# Patient Record
Sex: FEMALE | Race: ASIAN | NOT HISPANIC OR LATINO
[De-identification: names, ages, dates, MRNs, and addresses within clinical notes are randomized per-mention and may not be internally consistent; named-entity substitution may affect disease eponyms.]

---

## 2017-04-10 PROBLEM — Z00.00 ENCOUNTER FOR PREVENTIVE HEALTH EXAMINATION: Status: ACTIVE | Noted: 2017-04-10

## 2017-04-12 PROBLEM — Z86.79 HISTORY OF HYPERTENSION: Status: RESOLVED | Noted: 2017-04-12 | Resolved: 2017-04-12

## 2017-04-12 PROBLEM — Z86.39 HISTORY OF TYPE 2 DIABETES MELLITUS: Status: RESOLVED | Noted: 2017-04-12 | Resolved: 2017-04-12

## 2017-04-12 PROBLEM — Z86.39 HISTORY OF HYPERLIPIDEMIA: Status: RESOLVED | Noted: 2017-04-12 | Resolved: 2017-04-12

## 2017-04-12 PROBLEM — I25.10 CAD (CORONARY ARTERY DISEASE): Status: RESOLVED | Noted: 2017-04-12 | Resolved: 2017-04-12

## 2017-04-12 RX ORDER — AMLODIPINE BESYLATE 10 MG/1
10 TABLET ORAL
Refills: 0 | Status: ACTIVE | COMMUNITY

## 2017-04-12 RX ORDER — PRASUGREL HYDROCHLORIDE 10 MG/1
10 TABLET, COATED ORAL
Refills: 0 | Status: ACTIVE | COMMUNITY

## 2017-04-12 RX ORDER — ATORVASTATIN CALCIUM 20 MG/1
20 TABLET, FILM COATED ORAL
Refills: 0 | Status: ACTIVE | COMMUNITY

## 2017-04-12 RX ORDER — GEMFIBROZIL 600 MG/1
600 TABLET, FILM COATED ORAL
Refills: 0 | Status: ACTIVE | COMMUNITY

## 2017-04-12 RX ORDER — OMEPRAZOLE 20 MG/1
20 CAPSULE, DELAYED RELEASE ORAL
Refills: 0 | Status: ACTIVE | COMMUNITY

## 2017-04-12 RX ORDER — METFORMIN HYDROCHLORIDE 1000 MG/1
1000 TABLET, COATED ORAL
Refills: 0 | Status: ACTIVE | COMMUNITY

## 2017-04-12 RX ORDER — METOPROLOL SUCCINATE 50 MG/1
50 TABLET, EXTENDED RELEASE ORAL
Refills: 0 | Status: ACTIVE | COMMUNITY

## 2017-04-12 RX ORDER — ASPIRIN 81 MG/1
81 TABLET ORAL
Refills: 0 | Status: ACTIVE | COMMUNITY

## 2017-04-21 ENCOUNTER — APPOINTMENT (OUTPATIENT)
Dept: THORACIC SURGERY | Facility: CLINIC | Age: 67
End: 2017-04-21

## 2017-04-21 ENCOUNTER — OUTPATIENT (OUTPATIENT)
Dept: OUTPATIENT SERVICES | Facility: HOSPITAL | Age: 67
LOS: 1 days | End: 2017-04-21
Payer: MEDICAID

## 2017-04-21 VITALS
OXYGEN SATURATION: 97 % | WEIGHT: 121 LBS | TEMPERATURE: 97.8 F | DIASTOLIC BLOOD PRESSURE: 72 MMHG | RESPIRATION RATE: 20 BRPM | SYSTOLIC BLOOD PRESSURE: 150 MMHG | BODY MASS INDEX: 27.22 KG/M2 | HEIGHT: 56 IN | HEART RATE: 100 BPM

## 2017-04-21 DIAGNOSIS — Z86.39 PERSONAL HISTORY OF OTHER ENDOCRINE, NUTRITIONAL AND METABOLIC DISEASE: ICD-10-CM

## 2017-04-21 DIAGNOSIS — Z86.79 PERSONAL HISTORY OF OTHER DISEASES OF THE CIRCULATORY SYSTEM: ICD-10-CM

## 2017-04-21 DIAGNOSIS — I25.10 ATHEROSCLEROTIC HEART DISEASE OF NATIVE CORONARY ARTERY W/OUT ANGINA PECTORIS: ICD-10-CM

## 2017-04-24 DIAGNOSIS — R91.1 SOLITARY PULMONARY NODULE: ICD-10-CM

## 2017-04-27 NOTE — PATIENT PROFILE ADULT. - PMH
CAD (coronary artery disease)    Diabetes    Hyperlipidemia    Hypertension    Lung cancer    Myocardial infarction

## 2017-04-28 ENCOUNTER — RESULT REVIEW (OUTPATIENT)
Age: 67
End: 2017-04-28

## 2017-04-28 ENCOUNTER — INPATIENT (INPATIENT)
Facility: HOSPITAL | Age: 67
LOS: 2 days | Discharge: HOME | DRG: 165 | End: 2017-05-01
Attending: THORACIC SURGERY (CARDIOTHORACIC VASCULAR SURGERY) | Admitting: THORACIC SURGERY (CARDIOTHORACIC VASCULAR SURGERY)
Payer: MEDICAID

## 2017-04-28 ENCOUNTER — APPOINTMENT (OUTPATIENT)
Dept: THORACIC SURGERY | Facility: HOSPITAL | Age: 67
End: 2017-04-28

## 2017-04-28 VITALS
SYSTOLIC BLOOD PRESSURE: 143 MMHG | HEART RATE: 98 BPM | WEIGHT: 117.29 LBS | OXYGEN SATURATION: 99 % | RESPIRATION RATE: 16 BRPM | HEIGHT: 62 IN | DIASTOLIC BLOOD PRESSURE: 65 MMHG | TEMPERATURE: 99 F

## 2017-04-28 DIAGNOSIS — Z41.9 ENCOUNTER FOR PROCEDURE FOR PURPOSES OTHER THAN REMEDYING HEALTH STATE, UNSPECIFIED: Chronic | ICD-10-CM

## 2017-04-28 DIAGNOSIS — Z98.890 OTHER SPECIFIED POSTPROCEDURAL STATES: Chronic | ICD-10-CM

## 2017-04-28 DIAGNOSIS — R91.8 OTHER NONSPECIFIC ABNORMAL FINDING OF LUNG FIELD: ICD-10-CM

## 2017-04-28 LAB
ANION GAP SERPL CALC-SCNC: 9 MMOL/L — SIGNIFICANT CHANGE UP (ref 9–16)
APTT BLD: 36.7 SEC — SIGNIFICANT CHANGE UP (ref 27.5–37.4)
BASOPHILS NFR BLD AUTO: 0.2 % — SIGNIFICANT CHANGE UP (ref 0–2)
BUN SERPL-MCNC: 9 MG/DL — SIGNIFICANT CHANGE UP (ref 7–23)
CALCIUM SERPL-MCNC: 8.1 MG/DL — LOW (ref 8.5–10.5)
CHLORIDE SERPL-SCNC: 105 MMOL/L — SIGNIFICANT CHANGE UP (ref 96–108)
CO2 SERPL-SCNC: 27 MMOL/L — SIGNIFICANT CHANGE UP (ref 22–31)
CREAT SERPL-MCNC: 0.35 MG/DL — LOW (ref 0.5–1.3)
EOSINOPHIL NFR BLD AUTO: 0.1 % — SIGNIFICANT CHANGE UP (ref 0–6)
GLUCOSE SERPL-MCNC: 183 MG/DL — HIGH (ref 70–99)
HCT VFR BLD CALC: 30.9 % — LOW (ref 34.5–45)
HGB BLD-MCNC: 10.3 G/DL — LOW (ref 11.5–15.5)
INR BLD: 1.09 — SIGNIFICANT CHANGE UP (ref 0.88–1.16)
LYMPHOCYTES # BLD AUTO: 9.1 % — LOW (ref 13–44)
MAGNESIUM SERPL-MCNC: 1.4 MG/DL — LOW (ref 1.6–2.4)
MCHC RBC-ENTMCNC: 27.2 PG — SIGNIFICANT CHANGE UP (ref 27–34)
MCHC RBC-ENTMCNC: 33.3 G/DL — SIGNIFICANT CHANGE UP (ref 32–36)
MCV RBC AUTO: 81.7 FL — SIGNIFICANT CHANGE UP (ref 80–100)
MONOCYTES NFR BLD AUTO: 4.6 % — SIGNIFICANT CHANGE UP (ref 2–14)
NEUTROPHILS NFR BLD AUTO: 86 % — HIGH (ref 43–77)
PLATELET # BLD AUTO: 391 K/UL — SIGNIFICANT CHANGE UP (ref 150–400)
POTASSIUM SERPL-MCNC: 3.2 MMOL/L — LOW (ref 3.5–5.3)
POTASSIUM SERPL-SCNC: 3.2 MMOL/L — LOW (ref 3.5–5.3)
PROTHROM AB SERPL-ACNC: 12.1 SEC — SIGNIFICANT CHANGE UP (ref 9.8–12.7)
RBC # BLD: 3.78 M/UL — LOW (ref 3.8–5.2)
RBC # FLD: 14.7 % — SIGNIFICANT CHANGE UP (ref 10.3–16.9)
SODIUM SERPL-SCNC: 141 MMOL/L — SIGNIFICANT CHANGE UP (ref 135–145)
WBC # BLD: 22.8 K/UL — HIGH (ref 3.8–10.5)
WBC # FLD AUTO: 22.8 K/UL — HIGH (ref 3.8–10.5)

## 2017-04-28 PROCEDURE — 32663 THORACOSCOPY W/LOBECTOMY: CPT

## 2017-04-28 PROCEDURE — 32668 THORACOSCOPY W/W RESECT DIAG: CPT

## 2017-04-28 PROCEDURE — 32674 THORACOSCOPY LYMPH NODE EXC: CPT

## 2017-04-28 PROCEDURE — 32663 THORACOSCOPY W/LOBECTOMY: CPT | Mod: AS

## 2017-04-28 PROCEDURE — 32668 THORACOSCOPY W/W RESECT DIAG: CPT | Mod: AS

## 2017-04-28 PROCEDURE — 71010: CPT | Mod: 26

## 2017-04-28 PROCEDURE — 32674 THORACOSCOPY LYMPH NODE EXC: CPT | Mod: AS

## 2017-04-28 PROCEDURE — S2900 ROBOTIC SURGICAL SYSTEM: CPT | Mod: NC

## 2017-04-28 RX ORDER — DEXTROSE 50 % IN WATER 50 %
25 SYRINGE (ML) INTRAVENOUS ONCE
Qty: 0 | Refills: 0 | Status: DISCONTINUED | OUTPATIENT
Start: 2017-04-28 | End: 2017-05-01

## 2017-04-28 RX ORDER — GLUCAGON INJECTION, SOLUTION 0.5 MG/.1ML
1 INJECTION, SOLUTION SUBCUTANEOUS ONCE
Qty: 0 | Refills: 0 | Status: DISCONTINUED | OUTPATIENT
Start: 2017-04-28 | End: 2017-05-01

## 2017-04-28 RX ORDER — ATORVASTATIN CALCIUM 80 MG/1
20 TABLET, FILM COATED ORAL AT BEDTIME
Qty: 0 | Refills: 0 | Status: DISCONTINUED | OUTPATIENT
Start: 2017-04-28 | End: 2017-05-01

## 2017-04-28 RX ORDER — SODIUM CHLORIDE 9 MG/ML
1000 INJECTION, SOLUTION INTRAVENOUS
Qty: 0 | Refills: 0 | Status: DISCONTINUED | OUTPATIENT
Start: 2017-04-28 | End: 2017-04-29

## 2017-04-28 RX ORDER — AMLODIPINE BESYLATE 2.5 MG/1
5 TABLET ORAL DAILY
Qty: 0 | Refills: 0 | Status: DISCONTINUED | OUTPATIENT
Start: 2017-04-28 | End: 2017-04-29

## 2017-04-28 RX ORDER — CEFAZOLIN SODIUM 1 G
2000 VIAL (EA) INJECTION EVERY 8 HOURS
Qty: 0 | Refills: 0 | Status: COMPLETED | OUTPATIENT
Start: 2017-04-29 | End: 2017-04-29

## 2017-04-28 RX ORDER — BUPIVACAINE 13.3 MG/ML
20 INJECTION, SUSPENSION, LIPOSOMAL INFILTRATION ONCE
Qty: 0 | Refills: 0 | Status: DISCONTINUED | OUTPATIENT
Start: 2017-04-28 | End: 2017-04-29

## 2017-04-28 RX ORDER — ASPIRIN/CALCIUM CARB/MAGNESIUM 324 MG
81 TABLET ORAL DAILY
Qty: 0 | Refills: 0 | Status: DISCONTINUED | OUTPATIENT
Start: 2017-04-29 | End: 2017-05-01

## 2017-04-28 RX ORDER — ACETAMINOPHEN 500 MG
1000 TABLET ORAL ONCE
Qty: 0 | Refills: 0 | Status: COMPLETED | OUTPATIENT
Start: 2017-04-29 | End: 2017-04-29

## 2017-04-28 RX ORDER — PANTOPRAZOLE SODIUM 20 MG/1
40 TABLET, DELAYED RELEASE ORAL
Qty: 0 | Refills: 0 | Status: DISCONTINUED | OUTPATIENT
Start: 2017-04-28 | End: 2017-05-01

## 2017-04-28 RX ORDER — INSULIN LISPRO 100/ML
VIAL (ML) SUBCUTANEOUS
Qty: 0 | Refills: 0 | Status: DISCONTINUED | OUTPATIENT
Start: 2017-04-28 | End: 2017-05-01

## 2017-04-28 RX ORDER — DEXTROSE 50 % IN WATER 50 %
1 SYRINGE (ML) INTRAVENOUS ONCE
Qty: 0 | Refills: 0 | Status: DISCONTINUED | OUTPATIENT
Start: 2017-04-28 | End: 2017-05-01

## 2017-04-28 RX ORDER — SODIUM CHLORIDE 9 MG/ML
1000 INJECTION, SOLUTION INTRAVENOUS
Qty: 0 | Refills: 0 | Status: DISCONTINUED | OUTPATIENT
Start: 2017-04-28 | End: 2017-05-01

## 2017-04-28 RX ORDER — METOPROLOL TARTRATE 50 MG
12.5 TABLET ORAL EVERY 6 HOURS
Qty: 0 | Refills: 0 | Status: DISCONTINUED | OUTPATIENT
Start: 2017-04-29 | End: 2017-04-30

## 2017-04-28 RX ORDER — MORPHINE SULFATE 50 MG/1
2 CAPSULE, EXTENDED RELEASE ORAL EVERY 4 HOURS
Qty: 0 | Refills: 0 | Status: DISCONTINUED | OUTPATIENT
Start: 2017-04-28 | End: 2017-04-29

## 2017-04-28 RX ORDER — DEXTROSE 50 % IN WATER 50 %
12.5 SYRINGE (ML) INTRAVENOUS ONCE
Qty: 0 | Refills: 0 | Status: DISCONTINUED | OUTPATIENT
Start: 2017-04-28 | End: 2017-05-01

## 2017-04-28 RX ADMIN — AMLODIPINE BESYLATE 5 MILLIGRAM(S): 2.5 TABLET ORAL at 23:13

## 2017-04-28 RX ADMIN — PANTOPRAZOLE SODIUM 40 MILLIGRAM(S): 20 TABLET, DELAYED RELEASE ORAL at 23:11

## 2017-04-28 RX ADMIN — ATORVASTATIN CALCIUM 20 MILLIGRAM(S): 80 TABLET, FILM COATED ORAL at 23:12

## 2017-04-28 NOTE — H&P ADULT - HISTORY OF PRESENT ILLNESS
65 y/o female with PMHx HTN, diabetes, CAD s/p stents x 2, HLD, who presented to her PCP with right hip herpes in 2/2017.  She was incidentally found to have a LLL cavity mass; Quantiferon TB Gold test was negative, sputum cx negative in 2017.  CT chest showed a solid mass with several areas of cavitation in the LLL. PET showed a cavitary lesion in the LLL of the lung as well as some small pulmonary nodules.  Denies smoking history, denies fever/chills, SOB, cough or hemoptysis.      Patient seen in [facility], denies any changes to medications since last seen in Dr. Monroe's office.     **Last took effient 1 week ago**

## 2017-04-28 NOTE — BRIEF OPERATIVE NOTE - PROCEDURE
Lobectomy of left lung  04/28/2017  LVATS, LLL wedge resection followed by completion left lower lobectomy with mediastinal lymph node dissection.  Active  PMAINGON

## 2017-04-28 NOTE — H&P ADULT - ASSESSMENT
67 y/o female with PMHx HTN, diabetes, CAD s/p stents x 2, HLD, who presented to her PCP with right hip herpes in 2/2017.  She was incidentally found to have a LLL cavity mass; Evaluated by Dr. Monroe in the out patient setting, and all appropriate CT scans/PET were done as part of pre-op work up.  She is here today for elective surgery.

## 2017-04-28 NOTE — H&P ADULT - NSHPREVIEWOFSYSTEMS_GEN_ALL_CORE
Review of Systems  CONSTITUTIONAL:  Denies Fevers / chills, sweats, fatigue, weight loss, weight gain                                      NEURO:  Denies parathesias, seizures, syncope, confusion                                                                                EYES:  Denies Blurry vision, discharge, pain, loss of vision                                                                                    ENMT:  Denies Difficulty hearing, vertigo, dysphagia, epistaxis, recent dental work                                       CV:  Denies Chest pain, palpitations, ALBERT, orthopnea                                                                                          RESPIRATORY:  Denies Wheezing, SOB, cough / sputum, hemoptysis                                                                GI:  Denies Nausea, vommiting, diarrhea, constipation, melena, difficulty swallowing                                               : Denies Hematuria, dysuria, urgency, incontinence                                                                                         MUSKULOSKELETAL:  Denies arthritis, joint swelling, muscle weakness                                                             SKIN/BREAST:  Denies rash, itching, chay loss, masses                                                                                            PSYCH:  Denies depresion, anxiety, suicidal ideation                                                                                               HEME/LYMPH:  Denies bruises easily, enlarged lymph nodes, tender lymph nodes                                        ENDOCRINE:  Denies cold intolerance, heat intolerance, polydipsia

## 2017-04-28 NOTE — H&P ADULT - PROBLEM SELECTOR PLAN 1
-Admit under Dr. Pablito Monroe for elective left VATS, LLL wedge resection possible lobectomy with MLND.   -NPO since midnight confirmed.  -Consent on chart, used .  -T&S ordered.  -2 units PRBC ordered.  -To 9LA post recovery.

## 2017-04-28 NOTE — H&P ADULT - NSHPPHYSICALEXAM_GEN_ALL_CORE
Physical Exam  CONSTITUTIONAL:                                                            NAD, daughter present  NEURO:                                                                       WNL                      EYES:                                                                                WNL  ENMT:                                                                               WNL  CV:                                                                                   WNL  RESPIRATORY:                                                                 WNL  GI:                                                                                     WNL  : LEA + / -                                                                  WNL  MUSKULOSKELETAL:                                                       WNL  SKIN / BREAST:                                                                  WNL Physical Exam  CONSTITUTIONAL:                                                            NAD, daughter present  NEURO:                                                                      No focal deficits                  EYES:                                                                                WNL  ENMT:                                                                               WNL  CV:                                                                                   WNL  RESPIRATORY:                                                                 WNL  GI:                                                                                     WNL  : LEA + / -                                                                  WNL  MUSKULOSKELETAL:                                                       WNL  SKIN / BREAST:                                                                  WNL

## 2017-04-29 LAB
ANION GAP SERPL CALC-SCNC: 10 MMOL/L — SIGNIFICANT CHANGE UP (ref 9–16)
BUN SERPL-MCNC: 10 MG/DL — SIGNIFICANT CHANGE UP (ref 7–23)
CALCIUM SERPL-MCNC: 8.5 MG/DL — SIGNIFICANT CHANGE UP (ref 8.5–10.5)
CHLORIDE SERPL-SCNC: 106 MMOL/L — SIGNIFICANT CHANGE UP (ref 96–108)
CO2 SERPL-SCNC: 24 MMOL/L — SIGNIFICANT CHANGE UP (ref 22–31)
CREAT SERPL-MCNC: 0.5 MG/DL — SIGNIFICANT CHANGE UP (ref 0.5–1.3)
GLUCOSE SERPL-MCNC: 177 MG/DL — HIGH (ref 70–99)
HBA1C BLD-MCNC: 7.3 % — HIGH (ref 4.8–5.6)
HCT VFR BLD CALC: 30.7 % — LOW (ref 34.5–45)
HGB BLD-MCNC: 10.2 G/DL — LOW (ref 11.5–15.5)
MAGNESIUM SERPL-MCNC: 1.6 MG/DL — SIGNIFICANT CHANGE UP (ref 1.6–2.4)
MCHC RBC-ENTMCNC: 27.3 PG — SIGNIFICANT CHANGE UP (ref 27–34)
MCHC RBC-ENTMCNC: 33.2 G/DL — SIGNIFICANT CHANGE UP (ref 32–36)
MCV RBC AUTO: 82.3 FL — SIGNIFICANT CHANGE UP (ref 80–100)
PLATELET # BLD AUTO: 406 K/UL — HIGH (ref 150–400)
POTASSIUM SERPL-MCNC: 3.5 MMOL/L — SIGNIFICANT CHANGE UP (ref 3.5–5.3)
POTASSIUM SERPL-SCNC: 3.5 MMOL/L — SIGNIFICANT CHANGE UP (ref 3.5–5.3)
RBC # BLD: 3.73 M/UL — LOW (ref 3.8–5.2)
RBC # FLD: 14.7 % — SIGNIFICANT CHANGE UP (ref 10.3–16.9)
SODIUM SERPL-SCNC: 140 MMOL/L — SIGNIFICANT CHANGE UP (ref 135–145)
WBC # BLD: 13.8 K/UL — HIGH (ref 3.8–10.5)
WBC # FLD AUTO: 13.8 K/UL — HIGH (ref 3.8–10.5)

## 2017-04-29 PROCEDURE — 71010: CPT | Mod: 26

## 2017-04-29 RX ORDER — ACETAMINOPHEN 500 MG
650 TABLET ORAL EVERY 4 HOURS
Qty: 0 | Refills: 0 | Status: DISCONTINUED | OUTPATIENT
Start: 2017-04-29 | End: 2017-05-01

## 2017-04-29 RX ORDER — METFORMIN HYDROCHLORIDE 850 MG/1
1000 TABLET ORAL
Qty: 0 | Refills: 0 | Status: DISCONTINUED | OUTPATIENT
Start: 2017-04-29 | End: 2017-04-30

## 2017-04-29 RX ORDER — MAGNESIUM OXIDE 400 MG ORAL TABLET 241.3 MG
800 TABLET ORAL ONCE
Qty: 0 | Refills: 0 | Status: COMPLETED | OUTPATIENT
Start: 2017-04-29 | End: 2017-04-29

## 2017-04-29 RX ORDER — AMLODIPINE BESYLATE 2.5 MG/1
10 TABLET ORAL DAILY
Qty: 0 | Refills: 0 | Status: DISCONTINUED | OUTPATIENT
Start: 2017-04-30 | End: 2017-05-01

## 2017-04-29 RX ORDER — POTASSIUM CHLORIDE 20 MEQ
20 PACKET (EA) ORAL ONCE
Qty: 0 | Refills: 0 | Status: COMPLETED | OUTPATIENT
Start: 2017-04-29 | End: 2017-04-29

## 2017-04-29 RX ORDER — ONDANSETRON 8 MG/1
4 TABLET, FILM COATED ORAL EVERY 4 HOURS
Qty: 0 | Refills: 0 | Status: DISCONTINUED | OUTPATIENT
Start: 2017-04-29 | End: 2017-05-01

## 2017-04-29 RX ORDER — MAGNESIUM OXIDE 400 MG ORAL TABLET 241.3 MG
400 TABLET ORAL ONCE
Qty: 0 | Refills: 0 | Status: COMPLETED | OUTPATIENT
Start: 2017-04-29 | End: 2017-04-29

## 2017-04-29 RX ORDER — POTASSIUM CHLORIDE 20 MEQ
40 PACKET (EA) ORAL ONCE
Qty: 0 | Refills: 0 | Status: COMPLETED | OUTPATIENT
Start: 2017-04-29 | End: 2017-04-29

## 2017-04-29 RX ADMIN — Medication 20 MILLIEQUIVALENT(S): at 10:06

## 2017-04-29 RX ADMIN — Medication 100 MILLIGRAM(S): at 17:15

## 2017-04-29 RX ADMIN — Medication 12.5 MILLIGRAM(S): at 17:14

## 2017-04-29 RX ADMIN — Medication 650 MILLIGRAM(S): at 21:17

## 2017-04-29 RX ADMIN — Medication 100 MILLIGRAM(S): at 09:07

## 2017-04-29 RX ADMIN — PANTOPRAZOLE SODIUM 40 MILLIGRAM(S): 20 TABLET, DELAYED RELEASE ORAL at 06:40

## 2017-04-29 RX ADMIN — Medication 40 MILLIEQUIVALENT(S): at 01:30

## 2017-04-29 RX ADMIN — Medication 1000 MILLIGRAM(S): at 02:15

## 2017-04-29 RX ADMIN — Medication 2: at 07:59

## 2017-04-29 RX ADMIN — Medication 12.5 MILLIGRAM(S): at 00:21

## 2017-04-29 RX ADMIN — ATORVASTATIN CALCIUM 20 MILLIGRAM(S): 80 TABLET, FILM COATED ORAL at 21:17

## 2017-04-29 RX ADMIN — Medication 650 MILLIGRAM(S): at 22:17

## 2017-04-29 RX ADMIN — Medication 400 MILLIGRAM(S): at 02:00

## 2017-04-29 RX ADMIN — Medication 81 MILLIGRAM(S): at 12:11

## 2017-04-29 RX ADMIN — Medication 2: at 12:33

## 2017-04-29 RX ADMIN — Medication 2: at 17:24

## 2017-04-29 RX ADMIN — MAGNESIUM OXIDE 400 MG ORAL TABLET 400 MILLIGRAM(S): 241.3 TABLET ORAL at 01:30

## 2017-04-29 RX ADMIN — Medication 4: at 00:15

## 2017-04-29 RX ADMIN — Medication 12.5 MILLIGRAM(S): at 23:21

## 2017-04-29 RX ADMIN — MAGNESIUM OXIDE 400 MG ORAL TABLET 800 MILLIGRAM(S): 241.3 TABLET ORAL at 10:06

## 2017-04-29 RX ADMIN — Medication 12.5 MILLIGRAM(S): at 06:41

## 2017-04-29 RX ADMIN — Medication 100 MILLIGRAM(S): at 00:21

## 2017-04-29 RX ADMIN — AMLODIPINE BESYLATE 5 MILLIGRAM(S): 2.5 TABLET ORAL at 06:40

## 2017-04-29 RX ADMIN — Medication 12.5 MILLIGRAM(S): at 12:08

## 2017-04-29 RX ADMIN — METFORMIN HYDROCHLORIDE 1000 MILLIGRAM(S): 850 TABLET ORAL at 17:14

## 2017-04-29 NOTE — PROGRESS NOTE ADULT - ASSESSMENT
67 y/o female with PMHx HTN, diabetes, CAD s/p stents x 2, HLD, who presented to her PCP with right hip herpes in 2/2017.  She was incidentally found to have a LLL cavity mass; Evaluated by Dr. Monroe in the out patient setting, and all appropriate CT scans/PET were done as part of pre-op work up.  She is here today for elective surgery.         Problem/Plan - 1:  ·  Problem: Lung mass.  Plan: -Admit under Dr. Pablito Monroe for elective left VATS, LLL wedge resection possible lobectomy with MLND.   -NPO since midnight confirmed.  -Consent on chart, used .  -T&S ordered.  -2 units PRBC ordered.  -To 9LA post recovery. 67 y/o female with PMHx HTN, diabetes, CAD s/p stents x 2, HLD, who presented to her PCP and was incidentally found to have a LLL cavity mass; Evaluated by Dr. Monroe and now POD #1 from LVATS, RA, LLLx with MLND    -Cardiac: VSS. Home Metoprolol and Amlodipine started. Con't ASA, Statin.     -Heme: H&H stable. Will d/w Dr. Monroe when to resume Effient.     -Pulm: LCT remains, no air leak. Placed on H2O seal. Plan for CXR in am.  Con't to wean O2. Ambulating on RA this afternoon without desaturation.     -GI: ADAT. On Protonix for ulcer ppx. On Omeprazole at home.     -: [place]farland out. TOV.     -Dispo: Home when CT out and medically ready.

## 2017-04-29 NOTE — PROGRESS NOTE ADULT - SUBJECTIVE AND OBJECTIVE BOX
Patient discussed on morning rounds with Dr. Monroe    Operation / Date: LVAT, RA, LLLx w/ MLND 4/28    SUBJECTIVE ASSESSMENT:  66y Female doing well. Eating, ambulating. No acute complaints.       Vital Signs Last 24 Hrs  T(F): 98, Max: 98.3 (04-29 @ 05:00)  HR: 89 (85 - 122) SR  BP: 138/64 (119/59 - 141/74)  BP(mean): 92 (87 - 99)  RR: 18 (14 - 18)  SpO2: 95% (94% - 99%) 2L NC  I&O's Detail  I & Os for 24h ending 29 Apr 2017 07:00  =============================================  Total NET: -15 ml    I & Os for current day (as of 29 Apr 2017 16:08)  =============================================  Total NET: -100 ml      CHEST TUBE:  Yes AIR LEAKS: No. H2O SEAL.   LEA: TOV today    PHYSICAL EXAM:    General: NAD    Neurological: A&O, non focal    Cardiovascular: RRR, no m/r/g    Respiratory: Bibasilar crackles.     Gastrointestinal: +BS, benign non focal    Extremities: warm, no edema      Incision Sites:    LABS 4/29:                        10.2   13.8  )-----------( 406                 30.7       PT: 12.1 sec;   INR: 1.09    PTT:36.7 sec      140  |  106  |  10  ----------------------------<  177<H>  3.5   |  24  |  0.50    Ca    8.5      Mg     1.6           MEDICATIONS  (STANDING):  ceFAZolin   IVPB 2000milliGRAM(s) IV Intermittent every 8 hours  atorvastatin 20milliGRAM(s) Oral at bedtime  aspirin enteric coated 81milliGRAM(s) Oral daily  pantoprazole    Tablet 40milliGRAM(s) Oral before breakfast  metoprolol 12.5milliGRAM(s) Oral every 6 hours  insulin lispro (HumaLOG) corrective regimen sliding scale  SubCutaneous Before meals and at bedtime  dextrose 5%. 1000milliLiter(s) IV Continuous <Continuous>  dextrose 50% Injectable 12.5Gram(s) IV Push once  dextrose 50% Injectable 25Gram(s) IV Push once  dextrose 50% Injectable 25Gram(s) IV Push once  metFORMIN 1000milliGRAM(s) Oral two times a day with meals    MEDICATIONS  (PRN):  dextrose Gel 1Dose(s) Oral once PRN Blood Glucose LESS THAN 70 milliGRAM(s)/deciliter  glucagon  Injectable 1milliGRAM(s) IntraMuscular once PRN Glucose LESS THAN 70 milligrams/deciliter  ondansetron Injectable 4milliGRAM(s) IV Push every 4 hours PRN Nausea and/or Vomiting  acetaminophen   Tablet. 650milliGRAM(s) Oral every 4 hours PRN Mild Pain (1 - 3)        RADIOLOGY & ADDITIONAL TESTS: Patient discussed on morning rounds with Dr. Monroe    Operation / Date: LVAT, RA, LLLx w/ MLND 4/28    SUBJECTIVE ASSESSMENT:  66y Female doing well. Eating, ambulating. No acute complaints.       Vital Signs Last 24 Hrs  T(F): 98, Max: 98.3 (04-29 @ 05:00)  HR: 89 (85 - 122) SR  BP: 138/64 (119/59 - 141/74)  BP(mean): 92 (87 - 99)  RR: 18 (14 - 18)  SpO2: 95% (94% - 99%) 2L NC  I&O's Detail  I & Os for 24h ending 29 Apr 2017 07:00  =============================================  Total NET: -15 ml    I & Os for current day (as of 29 Apr 2017 16:08)  =============================================  Total NET: -100 ml      CHEST TUBE:  Yes AIR LEAKS: No. H2O SEAL.   LEA: TOV today    PHYSICAL EXAM:    General: NAD    Neurological: A&O, non focal    Cardiovascular: RRR, no m/r/g    Respiratory: Bibasilar crackles.     Gastrointestinal: +BS, benign non focal    Extremities: warm, no edema      Incision Sites:    LABS 4/29:                        10.2   13.8  )-----------( 406                 30.7       PT: 12.1 sec;   INR: 1.09    PTT:36.7 sec      140  |  106  |  10  ----------------------------<  177<H>  3.5   |  24  |  0.50    Ca    8.5      Mg     1.6           MEDICATIONS  (STANDING):  ceFAZolin   IVPB 2000milliGRAM(s) IV Intermittent every 8 hours  amlodipine 10 mg daily  atorvastatin 20milliGRAM(s) Oral at bedtime  aspirin enteric coated 81milliGRAM(s) Oral daily  pantoprazole    Tablet 40milliGRAM(s) Oral before breakfast  metoprolol 12.5milliGRAM(s) Oral every 6 hours  insulin lispro (HumaLOG) corrective regimen sliding scale  SubCutaneous Before meals and at bedtime  metFORMIN 1000milliGRAM(s) Oral two times a day with meals    MEDICATIONS  (PRN):  dextrose Gel 1Dose(s) Oral once PRN Blood Glucose LESS THAN 70 milliGRAM(s)/deciliter  glucagon  Injectable 1milliGRAM(s) IntraMuscular once PRN Glucose LESS THAN 70 milligrams/deciliter  ondansetron Injectable 4milliGRAM(s) IV Push every 4 hours PRN Nausea and/or Vomiting  acetaminophen   Tablet. 650milliGRAM(s) Oral every 4 hours PRN Mild Pain (1 - 3)

## 2017-04-30 LAB
ANION GAP SERPL CALC-SCNC: 9 MMOL/L — SIGNIFICANT CHANGE UP (ref 9–16)
BUN SERPL-MCNC: 10 MG/DL — SIGNIFICANT CHANGE UP (ref 7–23)
CALCIUM SERPL-MCNC: 8.5 MG/DL — SIGNIFICANT CHANGE UP (ref 8.5–10.5)
CHLORIDE SERPL-SCNC: 106 MMOL/L — SIGNIFICANT CHANGE UP (ref 96–108)
CO2 SERPL-SCNC: 23 MMOL/L — SIGNIFICANT CHANGE UP (ref 22–31)
CREAT SERPL-MCNC: 0.4 MG/DL — LOW (ref 0.5–1.3)
GLUCOSE SERPL-MCNC: 154 MG/DL — HIGH (ref 70–99)
HCT VFR BLD CALC: 31.1 % — LOW (ref 34.5–45)
HGB BLD-MCNC: 10.4 G/DL — LOW (ref 11.5–15.5)
MAGNESIUM SERPL-MCNC: 1.9 MG/DL — SIGNIFICANT CHANGE UP (ref 1.6–2.4)
MCHC RBC-ENTMCNC: 27.7 PG — SIGNIFICANT CHANGE UP (ref 27–34)
MCHC RBC-ENTMCNC: 33.4 G/DL — SIGNIFICANT CHANGE UP (ref 32–36)
MCV RBC AUTO: 82.9 FL — SIGNIFICANT CHANGE UP (ref 80–100)
PLATELET # BLD AUTO: 407 K/UL — HIGH (ref 150–400)
POTASSIUM SERPL-MCNC: 3.8 MMOL/L — SIGNIFICANT CHANGE UP (ref 3.5–5.3)
POTASSIUM SERPL-SCNC: 3.8 MMOL/L — SIGNIFICANT CHANGE UP (ref 3.5–5.3)
RBC # BLD: 3.75 M/UL — LOW (ref 3.8–5.2)
RBC # FLD: 14.9 % — SIGNIFICANT CHANGE UP (ref 10.3–16.9)
SODIUM SERPL-SCNC: 138 MMOL/L — SIGNIFICANT CHANGE UP (ref 135–145)
TSH SERPL-MCNC: 1.36 UIU/ML — SIGNIFICANT CHANGE UP (ref 0.35–4.94)
WBC # BLD: 10.1 K/UL — SIGNIFICANT CHANGE UP (ref 3.8–10.5)
WBC # FLD AUTO: 10.1 K/UL — SIGNIFICANT CHANGE UP (ref 3.8–10.5)

## 2017-04-30 PROCEDURE — 71010: CPT | Mod: 26

## 2017-04-30 PROCEDURE — 71010: CPT | Mod: 26,77

## 2017-04-30 RX ORDER — SENNA PLUS 8.6 MG/1
2 TABLET ORAL AT BEDTIME
Qty: 0 | Refills: 0 | Status: DISCONTINUED | OUTPATIENT
Start: 2017-04-30 | End: 2017-05-01

## 2017-04-30 RX ORDER — POTASSIUM CHLORIDE 20 MEQ
40 PACKET (EA) ORAL ONCE
Qty: 0 | Refills: 0 | Status: COMPLETED | OUTPATIENT
Start: 2017-04-30 | End: 2017-04-30

## 2017-04-30 RX ORDER — MAGNESIUM OXIDE 400 MG ORAL TABLET 241.3 MG
400 TABLET ORAL ONCE
Qty: 0 | Refills: 0 | Status: COMPLETED | OUTPATIENT
Start: 2017-04-30 | End: 2017-04-30

## 2017-04-30 RX ORDER — METOPROLOL TARTRATE 50 MG
12.5 TABLET ORAL ONCE
Qty: 0 | Refills: 0 | Status: COMPLETED | OUTPATIENT
Start: 2017-04-30 | End: 2017-04-30

## 2017-04-30 RX ORDER — PRASUGREL 5 MG/1
10 TABLET, FILM COATED ORAL DAILY
Qty: 0 | Refills: 0 | Status: DISCONTINUED | OUTPATIENT
Start: 2017-04-30 | End: 2017-05-01

## 2017-04-30 RX ORDER — METOPROLOL TARTRATE 50 MG
25 TABLET ORAL EVERY 8 HOURS
Qty: 0 | Refills: 0 | Status: DISCONTINUED | OUTPATIENT
Start: 2017-04-30 | End: 2017-05-01

## 2017-04-30 RX ORDER — HEPARIN SODIUM 5000 [USP'U]/ML
5000 INJECTION INTRAVENOUS; SUBCUTANEOUS EVERY 8 HOURS
Qty: 0 | Refills: 0 | Status: DISCONTINUED | OUTPATIENT
Start: 2017-04-30 | End: 2017-05-01

## 2017-04-30 RX ORDER — DOCUSATE SODIUM 100 MG
100 CAPSULE ORAL THREE TIMES A DAY
Qty: 0 | Refills: 0 | Status: DISCONTINUED | OUTPATIENT
Start: 2017-04-30 | End: 2017-05-01

## 2017-04-30 RX ADMIN — Medication 12.5 MILLIGRAM(S): at 19:09

## 2017-04-30 RX ADMIN — Medication 650 MILLIGRAM(S): at 12:24

## 2017-04-30 RX ADMIN — HEPARIN SODIUM 5000 UNIT(S): 5000 INJECTION INTRAVENOUS; SUBCUTANEOUS at 14:49

## 2017-04-30 RX ADMIN — Medication 650 MILLIGRAM(S): at 22:40

## 2017-04-30 RX ADMIN — Medication 100 MILLIGRAM(S): at 14:42

## 2017-04-30 RX ADMIN — AMLODIPINE BESYLATE 10 MILLIGRAM(S): 2.5 TABLET ORAL at 06:32

## 2017-04-30 RX ADMIN — Medication 12.5 MILLIGRAM(S): at 17:45

## 2017-04-30 RX ADMIN — ATORVASTATIN CALCIUM 20 MILLIGRAM(S): 80 TABLET, FILM COATED ORAL at 21:46

## 2017-04-30 RX ADMIN — Medication 12.5 MILLIGRAM(S): at 12:22

## 2017-04-30 RX ADMIN — Medication 2: at 08:51

## 2017-04-30 RX ADMIN — PRASUGREL 10 MILLIGRAM(S): 5 TABLET, FILM COATED ORAL at 12:22

## 2017-04-30 RX ADMIN — Medication 650 MILLIGRAM(S): at 21:40

## 2017-04-30 RX ADMIN — PANTOPRAZOLE SODIUM 40 MILLIGRAM(S): 20 TABLET, DELAYED RELEASE ORAL at 06:32

## 2017-04-30 RX ADMIN — SENNA PLUS 2 TABLET(S): 8.6 TABLET ORAL at 21:41

## 2017-04-30 RX ADMIN — Medication 2: at 22:37

## 2017-04-30 RX ADMIN — Medication 81 MILLIGRAM(S): at 12:22

## 2017-04-30 RX ADMIN — Medication 4: at 12:22

## 2017-04-30 RX ADMIN — MAGNESIUM OXIDE 400 MG ORAL TABLET 400 MILLIGRAM(S): 241.3 TABLET ORAL at 11:08

## 2017-04-30 RX ADMIN — Medication 12.5 MILLIGRAM(S): at 06:32

## 2017-04-30 RX ADMIN — Medication 40 MILLIEQUIVALENT(S): at 11:08

## 2017-04-30 RX ADMIN — Medication 650 MILLIGRAM(S): at 13:10

## 2017-04-30 RX ADMIN — METFORMIN HYDROCHLORIDE 1000 MILLIGRAM(S): 850 TABLET ORAL at 08:58

## 2017-04-30 RX ADMIN — Medication 100 MILLIGRAM(S): at 21:41

## 2017-04-30 RX ADMIN — HEPARIN SODIUM 5000 UNIT(S): 5000 INJECTION INTRAVENOUS; SUBCUTANEOUS at 21:25

## 2017-04-30 NOTE — CHART NOTE - NSCHARTNOTEFT_GEN_A_CORE
As per Dr. Prado, left pleural chest tube to be removed this morning at bedside. Using [facility]  #[id number], chest tube removed without any complications. U stitch tied down and occlusive dressing placed. Patient to remain in bed until follow up chest xray to evaluate for pneumothorax.

## 2017-04-30 NOTE — PROGRESS NOTE ADULT - SUBJECTIVE AND OBJECTIVE BOX
Patient discussed on morning rounds with Dr. Prado / Dr. Monroe     Operation / Date:     SUBJECTIVE ASSESSMENT:  66y Female         Vital Signs Last 24 Hrs  T(C): 36.7, Max: 36.7 (04-29 @ 14:02)  T(F): 98, Max: 98 (04-29 @ 14:02)  HR: 86 (80 - 92)  BP: 127/61 (127/61 - 154/78)  BP(mean): 88 (88 - 109)  RR: 20 (18 - 22)  SpO2: 92% (92% - 98%)  I&O's Detail  I & Os for 24h ending 30 Apr 2017 07:00  =============================================  IN:    Oral Fluid: 530 ml    lactated ringers.: 120 ml    IV PiggyBack: 100 ml    Total IN: 750 ml  ---------------------------------------------  OUT:    Voided: 600 ml    Chest Tube: 270 ml    Indwelling Catheter - Urethral: 160 ml    Total OUT: 1030 ml  ---------------------------------------------  Total NET: -280 ml    I & Os for current day (as of 30 Apr 2017 13:00)  =============================================  IN:    Total IN: 0 ml  ---------------------------------------------  OUT:    Total OUT: 0 ml  ---------------------------------------------  Total NET: 0 ml      CHEST TUBE:  Yes/No. AIR LEAKS: Yes/No. Suction / H2O SEAL.   LOUISE DRAIN:  Yes/No.  EPICARDIAL WIRES: Yes/No.  TIE DOWNS: Yes/No.  LEA: Yes/No.    PHYSICAL EXAM:    General:     Neurological:    Cardiovascular:    Respiratory:    Gastrointestinal:    Extremities:    Vascular:    Incision Sites:    LABS:                        10.4   10.1  )-----------( 407      ( 30 Apr 2017 06:24 )             31.1       COUMADIN:  Yes/No. REASON: .    PT/INR - ( 28 Apr 2017 21:00 )   PT: 12.1 sec;   INR: 1.09          PTT - ( 28 Apr 2017 21:00 )  PTT:36.7 sec    04-30    138  |  106  |  10  ----------------------------<  154<H>  3.8   |  23  |  0.40<L>    Ca    8.5      30 Apr 2017 06:22  Mg     1.9     04-30            MEDICATIONS  (STANDING):  atorvastatin 20milliGRAM(s) Oral at bedtime  aspirin enteric coated 81milliGRAM(s) Oral daily  pantoprazole    Tablet 40milliGRAM(s) Oral before breakfast  metoprolol 12.5milliGRAM(s) Oral every 6 hours  insulin lispro (HumaLOG) corrective regimen sliding scale  SubCutaneous Before meals and at bedtime  dextrose 5%. 1000milliLiter(s) IV Continuous <Continuous>  dextrose 50% Injectable 12.5Gram(s) IV Push once  dextrose 50% Injectable 25Gram(s) IV Push once  dextrose 50% Injectable 25Gram(s) IV Push once  amLODIPine   Tablet 10milliGRAM(s) Oral daily  metFORMIN 1000milliGRAM(s) Oral two times a day with meals  prasugrel 10milliGRAM(s) Oral daily    MEDICATIONS  (PRN):  dextrose Gel 1Dose(s) Oral once PRN Blood Glucose LESS THAN 70 milliGRAM(s)/deciliter  glucagon  Injectable 1milliGRAM(s) IntraMuscular once PRN Glucose LESS THAN 70 milligrams/deciliter  ondansetron Injectable 4milliGRAM(s) IV Push every 4 hours PRN Nausea and/or Vomiting  acetaminophen   Tablet. 650milliGRAM(s) Oral every 4 hours PRN Mild Pain (1 - 3)        RADIOLOGY & ADDITIONAL TESTS: Patient discussed on morning rounds with Dr. Prado / Dr. Monroe     Operation / Date: 4/28/17 Left VATS Robotic Assisted LLL wedge resection followed by completion lobectomy with mediastinal lymph node dissection     SUBJECTIVE ASSESSMENT:  Patient seen this morning at bedside ([facility]  #[id number]). Patient doing well, complaining of some pain at chest tube site but otherwise denies any other complaints. Denies any shortness of breath or chest pain .     Vital Signs Last 24 Hrs  T(C): 36.7, Max: 36.7 (04-29 @ 14:02)  T(F): 98, Max: 98 (04-29 @ 14:02)  HR: 86 (80 - 92)  BP: 127/61 (127/61 - 154/78)  BP(mean): 88 (88 - 109)  RR: 20 (18 - 22)  SpO2: 92% (92% - 98%)  I&O's Detail  I & Os for 24h ending 30 Apr 2017 07:00  =============================================  IN:    Oral Fluid: 530 ml    lactated ringers.: 120 ml    IV PiggyBack: 100 ml    Total IN: 750 ml  ---------------------------------------------  OUT:    Voided: 600 ml    Chest Tube: 270 ml    Indwelling Catheter - Urethral: 160 ml    Total OUT: 1030 ml  ---------------------------------------------  Total NET: -280 ml    I & Os for current day (as of 30 Apr 2017 13:00)  =============================================  IN:    Total IN: 0 ml  ---------------------------------------------  OUT:    Total OUT: 0 ml  ---------------------------------------------  Total NET: 0 ml      CHEST TUBE:  No, removed this AM.  EPICARDIAL WIRES: No  TIE DOWNS: Yes   LEA: No    PHYSICAL EXAM:    General: Patient lying comfortably in bed, no acute distress     Neurological: Alert and oriented. No focal neurological deficits     Cardiovascular: S1S2, RRR, no murmurs appreciated on exam     Respiratory: Clear to ausculation bilaterally     Gastrointestinal: Abdomen soft, non tender, non distended     Extremities: Warm and well perfused. No edema or calf tenderness     Vascular: Peripheral pulses 2+ bilaterally     Incision Sites: Left VATS incisions C/D/I, no drainage or surrounding erythema   Chest tube removed this AM, U stitch tied down in place and covered with occlusive dressing     LABS:                        10.4   10.1  )-----------( 407      ( 30 Apr 2017 06:24 )             31.1       COUMADIN:  No    PT/INR - ( 28 Apr 2017 21:00 )   PT: 12.1 sec;   INR: 1.09          PTT - ( 28 Apr 2017 21:00 )  PTT:36.7 sec    04-30    138  |  106  |  10  ----------------------------<  154<H>  3.8   |  23  |  0.40<L>    Ca    8.5      30 Apr 2017 06:22  Mg     1.9     04-30      MEDICATIONS  (STANDING):  atorvastatin 20milliGRAM(s) Oral at bedtime  aspirin enteric coated 81milliGRAM(s) Oral daily  pantoprazole    Tablet 40milliGRAM(s) Oral before breakfast  metoprolol 12.5milliGRAM(s) Oral every 6 hours  insulin lispro (HumaLOG) corrective regimen sliding scale  SubCutaneous Before meals and at bedtime  amLODIPine   Tablet 10milliGRAM(s) Oral daily  metFORMIN 1000milliGRAM(s) Oral two times a day with meals  prasugrel 10milliGRAM(s) Oral daily    MEDICATIONS  (PRN):  dextrose Gel 1Dose(s) Oral once PRN Blood Glucose LESS THAN 70 milliGRAM(s)/deciliter  glucagon  Injectable 1milliGRAM(s) IntraMuscular once PRN Glucose LESS THAN 70 milligrams/deciliter  ondansetron Injectable 4milliGRAM(s) IV Push every 4 hours PRN Nausea and/or Vomiting  acetaminophen   Tablet. 650milliGRAM(s) Oral every 4 hours PRN Mild Pain (1 - 3)    RADIOLOGY & ADDITIONAL TESTS:  4/30/17 CXR: pending official read, no pneumothorax following chest tube removal     A/P: 67 y/o female with PMHx HTN, diabetes, CAD s/p stents x 2, HLD, who presented to her PCP with right hip herpes in 2/2017.  She was incidentally found to have a LLL cavity mass; Quantiferon TB Gold test was negative, sputum cx negative in 2017.  CT chest showed a solid mass with several areas of cavitation in the LLL. PET showed a cavitary lesion in the LLL of the lung as well as some small pulmonary nodules.  Now s/p Left VATS RA LLL wedge resection followed by completion lobectomy on 4/28/17 with Dr. Monroe. Post op course uncomplicated, chest tube removed this AM with no pneumothorax.     Neurovascular: Stable. Pain improved following chest tube removal   - continue tylenol PRN     Cardiovascular: Hemodynamically stable. HR controlled.  - Hx of CAD s/p PCI. continue atorvastatin 20mg qhs, asa 81mg, metoprolol 12.5mg q6. (on Toprol 50XL at home) will titrate up as tolerated. Restart effient this morning once chest tube removed.   - HTN, continue amlodipine 10mg     Respiratory: 02 Sat = 92% on RA.  - Left pleural chest tube removed this AM, no ptx. Follow up CXR in AM   - Encourage C+DB and Use of IS 10x / hr while awake.    GI: Stable.  - continue PO diet   - continue protonix 40mg for GI ppx   - continue bowel regimen     Renal / : Cr 0.4, no active issues   -Monitor renal function.  -Monitor I/O's.    Endocrine: Hgb A1c 7.3   - continue insulin sliding scale   - check TSH this afternoon     Prophlaxis:  -DVT prophylaxis with 5000 SubQ Heparin q8h.  -SCD's    Disposition: Home tomorrow

## 2017-05-01 ENCOUNTER — TRANSCRIPTION ENCOUNTER (OUTPATIENT)
Age: 67
End: 2017-05-01

## 2017-05-01 VITALS
DIASTOLIC BLOOD PRESSURE: 68 MMHG | RESPIRATION RATE: 18 BRPM | OXYGEN SATURATION: 94 % | SYSTOLIC BLOOD PRESSURE: 138 MMHG | HEART RATE: 84 BPM

## 2017-05-01 PROCEDURE — 88342 IMHCHEM/IMCYTCHM 1ST ANTB: CPT

## 2017-05-01 PROCEDURE — 88360 TUMOR IMMUNOHISTOCHEM/MANUAL: CPT

## 2017-05-01 PROCEDURE — 85730 THROMBOPLASTIN TIME PARTIAL: CPT

## 2017-05-01 PROCEDURE — 88341 IMHCHEM/IMCYTCHM EA ADD ANTB: CPT

## 2017-05-01 PROCEDURE — 83036 HEMOGLOBIN GLYCOSYLATED A1C: CPT

## 2017-05-01 PROCEDURE — 88309 TISSUE EXAM BY PATHOLOGIST: CPT

## 2017-05-01 PROCEDURE — 86901 BLOOD TYPING SEROLOGIC RH(D): CPT

## 2017-05-01 PROCEDURE — 85025 COMPLETE CBC W/AUTO DIFF WBC: CPT

## 2017-05-01 PROCEDURE — 71010: CPT | Mod: 26

## 2017-05-01 PROCEDURE — 86850 RBC ANTIBODY SCREEN: CPT

## 2017-05-01 PROCEDURE — 88305 TISSUE EXAM BY PATHOLOGIST: CPT

## 2017-05-01 PROCEDURE — 84443 ASSAY THYROID STIM HORMONE: CPT

## 2017-05-01 PROCEDURE — 86900 BLOOD TYPING SEROLOGIC ABO: CPT

## 2017-05-01 PROCEDURE — 88331 PATH CONSLTJ SURG 1 BLK 1SPC: CPT

## 2017-05-01 PROCEDURE — 36415 COLL VENOUS BLD VENIPUNCTURE: CPT

## 2017-05-01 PROCEDURE — 88313 SPECIAL STAINS GROUP 2: CPT

## 2017-05-01 PROCEDURE — 88307 TISSUE EXAM BY PATHOLOGIST: CPT

## 2017-05-01 PROCEDURE — 85027 COMPLETE CBC AUTOMATED: CPT

## 2017-05-01 PROCEDURE — 80048 BASIC METABOLIC PNL TOTAL CA: CPT

## 2017-05-01 PROCEDURE — 81001 URINALYSIS AUTO W/SCOPE: CPT

## 2017-05-01 PROCEDURE — 85610 PROTHROMBIN TIME: CPT

## 2017-05-01 PROCEDURE — 83735 ASSAY OF MAGNESIUM: CPT

## 2017-05-01 PROCEDURE — 71045 X-RAY EXAM CHEST 1 VIEW: CPT

## 2017-05-01 RX ORDER — POTASSIUM CHLORIDE 20 MEQ
20 PACKET (EA) ORAL ONCE
Qty: 0 | Refills: 0 | Status: COMPLETED | OUTPATIENT
Start: 2017-05-01 | End: 2017-05-01

## 2017-05-01 RX ORDER — DOCUSATE SODIUM 100 MG
1 CAPSULE ORAL
Qty: 90 | Refills: 0 | OUTPATIENT
Start: 2017-05-01 | End: 2017-05-31

## 2017-05-01 RX ORDER — MAGNESIUM OXIDE 400 MG ORAL TABLET 241.3 MG
400 TABLET ORAL ONCE
Qty: 0 | Refills: 0 | Status: COMPLETED | OUTPATIENT
Start: 2017-05-01 | End: 2017-05-01

## 2017-05-01 RX ORDER — GEMFIBROZIL 600 MG
1 TABLET ORAL
Qty: 60 | Refills: 0 | OUTPATIENT
Start: 2017-05-01 | End: 2017-05-31

## 2017-05-01 RX ORDER — PRASUGREL 5 MG/1
1 TABLET, FILM COATED ORAL
Qty: 30 | Refills: 0 | OUTPATIENT
Start: 2017-05-01 | End: 2017-05-31

## 2017-05-01 RX ORDER — METFORMIN HYDROCHLORIDE 850 MG/1
1 TABLET ORAL
Qty: 60 | Refills: 0 | OUTPATIENT
Start: 2017-05-01 | End: 2017-05-31

## 2017-05-01 RX ORDER — ASPIRIN/CALCIUM CARB/MAGNESIUM 324 MG
1 TABLET ORAL
Qty: 30 | Refills: 0 | OUTPATIENT
Start: 2017-05-01 | End: 2017-05-31

## 2017-05-01 RX ORDER — OMEPRAZOLE 10 MG/1
1 CAPSULE, DELAYED RELEASE ORAL
Qty: 30 | Refills: 0 | OUTPATIENT
Start: 2017-05-01 | End: 2017-05-31

## 2017-05-01 RX ORDER — ATORVASTATIN CALCIUM 80 MG/1
1 TABLET, FILM COATED ORAL
Qty: 30 | Refills: 0 | OUTPATIENT
Start: 2017-05-01 | End: 2017-05-31

## 2017-05-01 RX ORDER — AMLODIPINE BESYLATE 2.5 MG/1
1 TABLET ORAL
Qty: 30 | Refills: 0 | OUTPATIENT
Start: 2017-05-01 | End: 2017-05-31

## 2017-05-01 RX ORDER — ACETAMINOPHEN 500 MG
2 TABLET ORAL
Qty: 100 | Refills: 0 | OUTPATIENT
Start: 2017-05-01

## 2017-05-01 RX ORDER — TRAMADOL HYDROCHLORIDE 50 MG/1
25 TABLET ORAL EVERY 6 HOURS
Qty: 0 | Refills: 0 | Status: DISCONTINUED | OUTPATIENT
Start: 2017-05-01 | End: 2017-05-01

## 2017-05-01 RX ORDER — GEMFIBROZIL 600 MG
1 TABLET ORAL
Qty: 0 | Refills: 0 | COMMUNITY

## 2017-05-01 RX ORDER — METOPROLOL TARTRATE 50 MG
1 TABLET ORAL
Qty: 30 | Refills: 0 | OUTPATIENT
Start: 2017-05-01 | End: 2017-05-31

## 2017-05-01 RX ORDER — ATORVASTATIN CALCIUM 80 MG/1
1 TABLET, FILM COATED ORAL
Qty: 0 | Refills: 0 | COMMUNITY

## 2017-05-01 RX ORDER — METFORMIN HYDROCHLORIDE 850 MG/1
1 TABLET ORAL
Qty: 0 | Refills: 0 | COMMUNITY

## 2017-05-01 RX ORDER — PRASUGREL 5 MG/1
1 TABLET, FILM COATED ORAL
Qty: 0 | Refills: 0 | COMMUNITY

## 2017-05-01 RX ORDER — AMLODIPINE BESYLATE 2.5 MG/1
1 TABLET ORAL
Qty: 0 | Refills: 0 | COMMUNITY

## 2017-05-01 RX ORDER — METOPROLOL TARTRATE 50 MG
1 TABLET ORAL
Qty: 0 | Refills: 0 | COMMUNITY

## 2017-05-01 RX ORDER — TRAMADOL HYDROCHLORIDE 50 MG/1
0.5 TABLET ORAL
Qty: 65 | Refills: 0 | OUTPATIENT
Start: 2017-05-01

## 2017-05-01 RX ORDER — OMEPRAZOLE 10 MG/1
1 CAPSULE, DELAYED RELEASE ORAL
Qty: 0 | Refills: 0 | COMMUNITY

## 2017-05-01 RX ORDER — ASPIRIN/CALCIUM CARB/MAGNESIUM 324 MG
1 TABLET ORAL
Qty: 0 | Refills: 0 | COMMUNITY

## 2017-05-01 RX ADMIN — PRASUGREL 10 MILLIGRAM(S): 5 TABLET, FILM COATED ORAL at 11:34

## 2017-05-01 RX ADMIN — Medication 25 MILLIGRAM(S): at 02:08

## 2017-05-01 RX ADMIN — MAGNESIUM OXIDE 400 MG ORAL TABLET 400 MILLIGRAM(S): 241.3 TABLET ORAL at 10:27

## 2017-05-01 RX ADMIN — Medication 650 MILLIGRAM(S): at 05:03

## 2017-05-01 RX ADMIN — Medication 100 MILLIGRAM(S): at 05:53

## 2017-05-01 RX ADMIN — Medication 81 MILLIGRAM(S): at 11:34

## 2017-05-01 RX ADMIN — Medication 650 MILLIGRAM(S): at 10:29

## 2017-05-01 RX ADMIN — HEPARIN SODIUM 5000 UNIT(S): 5000 INJECTION INTRAVENOUS; SUBCUTANEOUS at 05:52

## 2017-05-01 RX ADMIN — PANTOPRAZOLE SODIUM 40 MILLIGRAM(S): 20 TABLET, DELAYED RELEASE ORAL at 05:53

## 2017-05-01 RX ADMIN — Medication 20 MILLIEQUIVALENT(S): at 10:27

## 2017-05-01 RX ADMIN — TRAMADOL HYDROCHLORIDE 25 MILLIGRAM(S): 50 TABLET ORAL at 11:34

## 2017-05-01 RX ADMIN — AMLODIPINE BESYLATE 10 MILLIGRAM(S): 2.5 TABLET ORAL at 05:53

## 2017-05-01 RX ADMIN — Medication 2: at 07:43

## 2017-05-01 RX ADMIN — Medication 25 MILLIGRAM(S): at 10:27

## 2017-05-01 RX ADMIN — Medication 650 MILLIGRAM(S): at 06:00

## 2017-05-01 NOTE — DISCHARGE NOTE ADULT - PATIENT PORTAL LINK FT
“You can access the [employer] Patient Portal, offered by [facility], by registering with the following website: [email or web address]”

## 2017-05-01 NOTE — DISCHARGE NOTE ADULT - HOSPITAL COURSE
This is a 65 y/o female with PMHx HTN, diabetes, CAD s/p stents x 2 (on effient), HLD, who presented to her PCP with right hip herpes in 2/2017.  She was incidentally found to have a LLL cavity mass; Quantiferon TB Gold test was negative, sputum cx negative in 2017.  CT chest showed a solid mass with several areas of cavitation in the LLL. PET showed a cavitary lesion in the LLL of the lung as well as some small pulmonary nodules. Pt was admitted on 4/28/17 for uncomplicated left VATS LLL wedge resection followed by completion of LLL lobectomy and mediastinal lymph node dissection. Pt remained stable in PACU and was transferred to . POD#2 left CT was removed. POD#3 pt remained stable and as per Dr. Prado CT Thoracic attending pt stable and ready for discharge home.

## 2017-05-01 NOTE — DISCHARGE NOTE ADULT - CARE PROVIDER_API CALL
Pablito Monroe (MD), Surgery; Thoracic Surgery  [address] [place] [address]  Phone: [phone]  Fax: [phone]

## 2017-05-01 NOTE — DISCHARGE NOTE ADULT - CARE PROVIDERS DIRECT ADDRESSES
[email or web address]

## 2017-05-01 NOTE — PROGRESS NOTE ADULT - SUBJECTIVE AND OBJECTIVE BOX
Patient discussed on morning rounds with Dr. Prado    Operation / Date: Left VATS LLL lobectomy and mediastinal LN dissection    Surgeon: Dr. Pablito Monroe     SUBJECTIVE ASSESSMENT:  66y Female POD#3 from lung surgery. Pt denies CP or SOB. Endorses incisional site pain with is improved with Ultram.    Hospital Course:  67 y/o female with PMHx HTN, diabetes, CAD s/p stents x 2 (on effient), HLD, who presented to her PCP with right hip herpes in 2/2017.  She was incidentally found to have a LLL cavity mass; Quantiferon TB Gold test was negative, sputum cx negative in 2017.  CT chest showed a solid mass with several areas of cavitation in the LLL. PET showed a cavitary lesion in the LLL of the lung as well as some small pulmonary nodules. Pt was admitted on 4/28/17 for uncomplicated left VATS LLL wedge resection followed by completion of LLL lobectomy and mediastinal lymph node dissection. Pt remained stable in PACU and was transferred to . POD#2 left CT was removed. POD#3 pt remained stable and as per Dr. Prado CT Thoracic attending pt stable and ready for discharge home.     Vital Signs Last 24 Hrs  T(C): 36.7, Max: 36.7 (05-01 @ 09:00)  T(F): 98.1, Max: 98.1 (05-01 @ 09:00)  HR: 84 (76 - 86)  BP: 138/68 (124/70 - 140/73)  BP(mean): 97 (88 - 100)  RR: 18 (16 - 20)  SpO2: 94% (92% - 94%)    I&O's Detail    I & Os for current day (as of 01 May 2017 11:07)  =============================================  IN:    Total IN: 0 ml  ---------------------------------------------  OUT:    Voided: 750 ml    Total OUT: 750 ml  ---------------------------------------------  Total NET: -750 ml    TIE DOWNS REMOVED: No.    PHYSICAL EXAM:    General:   Neurological:  Cardiovascular:  Respiratory:  Gastrointestinal:  Extremities:  Vascular:  Incision Sites:    LABS:                        10.2   10.6  )-----------( 386      ( 01 May 2017 06:46 )             31.2     05-01    140  |  108  |  10  ----------------------------<  151<H>  3.9   |  23  |  0.35<L>    Ca    9.0      01 May 2017 06:46  Mg     2.0     05-01    MEDICATIONS  (STANDING):  atorvastatin 20milliGRAM(s) Oral at bedtime  aspirin enteric coated 81milliGRAM(s) Oral daily  pantoprazole    Tablet 40milliGRAM(s) Oral before breakfast  insulin lispro (HumaLOG) corrective regimen sliding scale  SubCutaneous Before meals and at bedtime  dextrose 5%. 1000milliLiter(s) IV Continuous <Continuous>  dextrose 50% Injectable 12.5Gram(s) IV Push once  dextrose 50% Injectable 25Gram(s) IV Push once  dextrose 50% Injectable 25Gram(s) IV Push once  amLODIPine   Tablet 10milliGRAM(s) Oral daily  prasugrel 10milliGRAM(s) Oral daily  docusate sodium 100milliGRAM(s) Oral three times a day  senna 2Tablet(s) Oral at bedtime  heparin  Injectable 5000Unit(s) SubCutaneous every 8 hours  metoprolol 25milliGRAM(s) Oral every 8 hours  traMADol 25milliGRAM(s) Oral every 6 hours      Discharge CXR: 5/1/17: no obvious PTX, official read pending     D/C Instructions:  -Please follow up with Dr. Pablito Monroe on 5/12/17 at 11:15AM.  The office is located at [facility], [facility], 4th floor. Call us with any questions #[phone]. Prompt #4.   -Please make a follow-up appointment with your primary care provider within 1-2 weeks of discharge to discuss your recent surgery.  -Walk daily as tolerated and use your incentive spirometer every hour.  -No driving or strenuous activity/exercise for 6 weeks, or until cleared by your surgeon. Please do note lift anything greater than ten pounds.  -Gently clean your incisions with anti-bacterial soap and water, pat dry.  You may leave them open to air. Please keep your left suture covered with a band-aid. The suture/stitch will be removed at your follow-up appointment with Dr. Pablito Monroe.  -Call your doctor if you have shortness of breath, chest pain not relieved by pain medication, dizziness, fever >101.5, or increased redness or drainage from incisions.-Please follow up with Dr. Pablito Monroe on ___.  The office is located at [facility], [facility], 4th floor. Call us with any questions #[phone]. Prompt #4. Patient discussed on morning rounds with Dr. Prado    Operation / Date: Left VATS LLL lobectomy and mediastinal LN dissection    Surgeon: Dr. Pablito Monroe     SUBJECTIVE ASSESSMENT:  66y Female POD#3 from lung surgery. Pt denies CP or SOB. Endorses incisional site pain with is improved with Ultram.    Hospital Course:  67 y/o female with PMHx HTN, diabetes, CAD s/p stents x 2 (on effient), HLD, who presented to her PCP with right hip herpes in 2/2017.  She was incidentally found to have a LLL cavity mass; Quantiferon TB Gold test was negative, sputum cx negative in 2017.  CT chest showed a solid mass with several areas of cavitation in the LLL. PET showed a cavitary lesion in the LLL of the lung as well as some small pulmonary nodules. Pt was admitted on 4/28/17 for uncomplicated left VATS LLL wedge resection followed by completion of LLL lobectomy and mediastinal lymph node dissection. Pt remained stable in PACU and was transferred to . POD#2 left CT was removed. POD#3 pt remained stable and as per Dr. Prado CT Thoracic attending pt stable and ready for discharge home.     Vital Signs Last 24 Hrs  T(C): 36.7, Max: 36.7 (05-01 @ 09:00)  T(F): 98.1, Max: 98.1 (05-01 @ 09:00)  HR: 84 (76 - 86)  BP: 138/68 (124/70 - 140/73)  BP(mean): 97 (88 - 100)  RR: 18 (16 - 20)  SpO2: 94% (92% - 94%)    I&O's Detail    I & Os for current day (as of 01 May 2017 11:07)  =============================================  IN:    Total IN: 0 ml  ---------------------------------------------  OUT:    Voided: 750 ml    Total OUT: 750 ml  ---------------------------------------------  Total NET: -750 ml    TIE DOWNS REMOVED: No.    PHYSICAL EXAM:    General: NAD, sitting upright in chair  Neurological: Moving all extremities   Cardiovascular: RRR, no m/r/g  Respiratory: CTA b/l   Gastrointestinal: NT-ND, soft   Extremities: Warm and well perfused no calf tenderness or edema b/l  Incision Sites: Left VATS incision sites CDI with no drainage or erythema, Dermabond in place. +tie down covered with gauze    LABS:                        10.2   10.6  )-----------( 386      ( 01 May 2017 06:46 )             31.2     05-01    140  |  108  |  10  ----------------------------<  151<H>  3.9   |  23  |  0.35<L>    Ca    9.0      01 May 2017 06:46  Mg     2.0     05-01    MEDICATIONS  (STANDING):  atorvastatin 20milliGRAM(s) Oral at bedtime  aspirin enteric coated 81milliGRAM(s) Oral daily  pantoprazole    Tablet 40milliGRAM(s) Oral before breakfast  insulin lispro (HumaLOG) corrective regimen sliding scale  SubCutaneous Before meals and at bedtime  dextrose 5%. 1000milliLiter(s) IV Continuous <Continuous>  dextrose 50% Injectable 12.5Gram(s) IV Push once  dextrose 50% Injectable 25Gram(s) IV Push once  dextrose 50% Injectable 25Gram(s) IV Push once  amLODIPine   Tablet 10milliGRAM(s) Oral daily  prasugrel 10milliGRAM(s) Oral daily  docusate sodium 100milliGRAM(s) Oral three times a day  senna 2Tablet(s) Oral at bedtime  heparin  Injectable 5000Unit(s) SubCutaneous every 8 hours  metoprolol 25milliGRAM(s) Oral every 8 hours  traMADol 25milliGRAM(s) Oral every 6 hours      Discharge CXR: 5/1/17: no obvious PTX, official read pending     D/C Instructions:  -Please follow up with Dr. Pablito Monroe on 5/12/17 at 11:15AM.  The office is located at [facility], [place], 4th floor. Call us with any questions #[phone]. Prompt #4.   -Please make a follow-up appointment with your primary care provider within 1-2 weeks of discharge to discuss your recent surgery.  -Walk daily as tolerated and use your incentive spirometer every hour.  -No driving or strenuous activity/exercise for 6 weeks, or until cleared by your surgeon. Please do note lift anything greater than ten pounds.  -Gently clean your incisions with anti-bacterial soap and water, pat dry.  You may leave them open to air. Please keep your left suture covered with a band-aid. The suture/stitch will be removed at your follow-up appointment with Dr. Pablito Monroe.  -Call your doctor if you have shortness of breath, chest pain not relieved by pain medication, dizziness, fever >101.5, or increased redness or drainage from incisions.-Please follow up with Dr. Pablito Monroe on ___.  The office is located at [facility], [facility], [address] [place] [address]. Call us with any questions #[phone]. Prompt #4. Patient discussed on morning rounds with Dr. Prado    Operation / Date: Left VATS LLL lobectomy and mediastinal LN dissection    Surgeon: Dr. Pablito Monroe     INT NUMBER: [id number]    SUBJECTIVE ASSESSMENT:  66y Female POD#3 from lung surgery. Pt denies CP or SOB. Endorses incisional site pain with is improved with Ultram.    Hospital Course:  65 y/o female with PMHx HTN, diabetes, CAD s/p stents x 2 (on effient), HLD, who presented to her PCP with right hip herpes in 2/2017.  She was incidentally found to have a LLL cavity mass; Quantiferon TB Gold test was negative, sputum cx negative in 2017.  CT chest showed a solid mass with several areas of cavitation in the LLL. PET showed a cavitary lesion in the LLL of the lung as well as some small pulmonary nodules. Pt was admitted on 4/28/17 for uncomplicated left VATS LLL wedge resection followed by completion of LLL lobectomy and mediastinal lymph node dissection. Pt remained stable in PACU and was transferred to . POD#2 left CT was removed. POD#3 pt remained stable and as per Dr. Prado CT Thoracic attending pt stable and ready for discharge home.     Vital Signs Last 24 Hrs  T(C): 36.7, Max: 36.7 (05-01 @ 09:00)  T(F): 98.1, Max: 98.1 (05-01 @ 09:00)  HR: 84 (76 - 86)  BP: 138/68 (124/70 - 140/73)  BP(mean): 97 (88 - 100)  RR: 18 (16 - 20)  SpO2: 94% (92% - 94%)    I&O's Detail    I & Os for current day (as of 01 May 2017 11:07)  =============================================  IN:    Total IN: 0 ml  ---------------------------------------------  OUT:    Voided: 750 ml    Total OUT: 750 ml  ---------------------------------------------  Total NET: -750 ml    TIE DOWNS REMOVED: No.    PHYSICAL EXAM:    General: NAD, sitting upright in chair  Neurological: Moving all extremities   Cardiovascular: RRR, no m/r/g  Respiratory: CTA b/l   Gastrointestinal: NT-ND, soft   Extremities: Warm and well perfused no calf tenderness or edema b/l  Incision Sites: Left VATS incision sites CDI with no drainage or erythema, Dermabond in place. +tie down covered with gauze    LABS:                        10.2   10.6  )-----------( 386      ( 01 May 2017 06:46 )             31.2     05-01    140  |  108  |  10  ----------------------------<  151<H>  3.9   |  23  |  0.35<L>    Ca    9.0      01 May 2017 06:46  Mg     2.0     05-01    MEDICATIONS  (STANDING):  atorvastatin 20milliGRAM(s) Oral at bedtime  aspirin enteric coated 81milliGRAM(s) Oral daily  pantoprazole    Tablet 40milliGRAM(s) Oral before breakfast  insulin lispro (HumaLOG) corrective regimen sliding scale  SubCutaneous Before meals and at bedtime  dextrose 5%. 1000milliLiter(s) IV Continuous <Continuous>  dextrose 50% Injectable 12.5Gram(s) IV Push once  dextrose 50% Injectable 25Gram(s) IV Push once  dextrose 50% Injectable 25Gram(s) IV Push once  amLODIPine   Tablet 10milliGRAM(s) Oral daily  prasugrel 10milliGRAM(s) Oral daily  docusate sodium 100milliGRAM(s) Oral three times a day  senna 2Tablet(s) Oral at bedtime  heparin  Injectable 5000Unit(s) SubCutaneous every 8 hours  metoprolol 25milliGRAM(s) Oral every 8 hours  traMADol 25milliGRAM(s) Oral every 6 hours      Discharge CXR: 5/1/17: no obvious PTX, official read pending     D/C Instructions:  -Please follow up with Dr. Pablito Monroe on 5/12/17 at 11:15AM.  The office is located at [facility], [address] [place] [address]. Call us with any questions #[phone]. Prompt #4.   -Please make a follow-up appointment with your primary care provider within 1-2 weeks of discharge to discuss your recent surgery.  -Walk daily as tolerated and use your incentive spirometer every hour.  -No driving or strenuous activity/exercise for 6 weeks, or until cleared by your surgeon. Please do note lift anything greater than ten pounds.  -Gently clean your incisions with anti-bacterial soap and water, pat dry.  You may leave them open to air. Please keep your left suture covered with a band-aid. The suture/stitch will be removed at your follow-up appointment with Dr. Pablito Monroe.  -Call your doctor if you have shortness of breath, chest pain not relieved by pain medication, dizziness, fever >101.5, or increased redness or drainage from incisions.-Please follow up with Dr. Pablito Monroe on ___.  The office is located at [facility], 4th [facility]. Call us with any questions #[phone]. Prompt #4.

## 2017-05-01 NOTE — DISCHARGE NOTE ADULT - CARE PLAN
Principal Discharge DX:	Lung mass  Goal:	recovery from Left VATS LLL lobectomy  Instructions for follow-up, activity and diet:	-Please follow up with Dr. Pablito Monroe on ___.  The office is located at [facility], [facility], 4th floor. Call us with any questions #[phone]. Prompt #4.   -Please make a follow-up appointment with your primary care provider within 1-2 weeks of discharge to discuss your recent surgery.    -Walk daily as tolerated and use your incentive spirometer every hour.    -No driving or strenuous activity/exercise for 6 weeks, or until cleared by your surgeon.    -Gently clean your incisions with anti-bacterial soap and water, pat dry.  You may leave them open to air. Please keep your left suture covered with a band-aid. The suture/stitch will be removed at your follow-up appointment with Dr. Pablito Monroe.    -Call your doctor if you have shortness of breath, chest pain not relieved by pain medication, dizziness, fever >101.5, or increased redness or drainage from incisions. Principal Discharge DX:	Lung mass  Goal:	recovery from Left VATS LLL lobectomy  Instructions for follow-up, activity and diet:	-Please follow up with Dr. Pablito Monroe on 5/12/17 at 11:15AM.  The office is located at [facility], [facility], 4th floor. Call us with any questions #[phone]. Prompt #4.   -Please make a follow-up appointment with your primary care provider within 1-2 weeks of discharge to discuss your recent surgery.    -Walk daily as tolerated and use your incentive spirometer every hour.  -No driving or strenuous activity/exercise for 6 weeks, or until cleared by your surgeon. Please do note lift anything greater than ten pounds.  -Gently clean your incisions with anti-bacterial soap and water, pat dry.  You may leave them open to air. Please keep your left suture covered with a band-aid. The suture/stitch will be removed at your follow-up appointment with Dr. Pablito Monroe.    -Call your doctor if you have shortness of breath, chest pain not relieved by pain medication, dizziness, fever >101.5, or increased redness or drainage from incisions. Principal Discharge DX:	Lung mass  Goal:	recovery from Left VATS LLL lobectomy  Instructions for follow-up, activity and diet:	-Please follow up with Dr. Pablito Monroe on 5/12/17 at 11:15AM.  The office is located at [facility], [facility], 4th floor. Call us with any questions #[phone]. Prompt #4.   -Please make a follow-up appointment with your primary care provider within 1-2 weeks of discharge to discuss your recent surgery.    -Walk daily as tolerated and use your incentive spirometer every hour.  -No driving or strenuous activity/exercise for 6 weeks, or until cleared by your surgeon. Please do note lift anything greater than ten pounds.  -Gently clean your incisions with anti-bacterial soap and water, pat dry.  You may leave them open to air. Please keep your left suture covered with a band-aid. The suture/stitch will be removed at your follow-up appointment with Dr. Pablito Monroe.    -Call your doctor if you have shortness of breath, chest pain not relieved by pain medication, dizziness, fever >101.5, or increased redness or drainage from incisions. Principal Discharge DX:	Lung mass  Goal:	recovery from Left VATS LLL lobectomy  Instructions for follow-up, activity and diet:	-Please follow up with Dr. Pablito Monroe on 5/12/17 at 11:15AM.  The office is located at [facility], [facility], 4th floor. Call us with any questions #[phone]. Prompt #4.   -Please make a follow-up appointment with your primary care provider within 1-2 weeks of discharge to discuss your recent surgery.    -Walk daily as tolerated and use your incentive spirometer every hour.  -No driving or strenuous activity/exercise for 6 weeks, or until cleared by your surgeon. Please do note lift anything greater than ten pounds.  -Gently clean your incisions with anti-bacterial soap and water, pat dry.  You may leave them open to air. Please keep your left suture covered with a band-aid. The suture/stitch will be removed at your follow-up appointment with Dr. Pablito Monroe.    -Call your doctor if you have shortness of breath, chest pain not relieved by pain medication, dizziness, fever >101.5, or increased redness or drainage from incisions.

## 2017-05-04 LAB — SURGICAL PATHOLOGY STUDY: SIGNIFICANT CHANGE UP

## 2017-05-05 DIAGNOSIS — E11.9 TYPE 2 DIABETES MELLITUS WITHOUT COMPLICATIONS: ICD-10-CM

## 2017-05-05 DIAGNOSIS — E78.5 HYPERLIPIDEMIA, UNSPECIFIED: ICD-10-CM

## 2017-05-05 DIAGNOSIS — Z79.84 LONG TERM (CURRENT) USE OF ORAL HYPOGLYCEMIC DRUGS: ICD-10-CM

## 2017-05-05 DIAGNOSIS — D64.9 ANEMIA, UNSPECIFIED: ICD-10-CM

## 2017-05-05 DIAGNOSIS — Z86.73 PERSONAL HISTORY OF TRANSIENT ISCHEMIC ATTACK (TIA), AND CEREBRAL INFARCTION WITHOUT RESIDUAL DEFICITS: ICD-10-CM

## 2017-05-05 DIAGNOSIS — Z98.61 CORONARY ANGIOPLASTY STATUS: ICD-10-CM

## 2017-05-05 DIAGNOSIS — I25.2 OLD MYOCARDIAL INFARCTION: ICD-10-CM

## 2017-05-05 DIAGNOSIS — R91.8 OTHER NONSPECIFIC ABNORMAL FINDING OF LUNG FIELD: ICD-10-CM

## 2017-05-05 DIAGNOSIS — R01.1 CARDIAC MURMUR, UNSPECIFIED: ICD-10-CM

## 2017-05-05 DIAGNOSIS — I25.10 ATHEROSCLEROTIC HEART DISEASE OF NATIVE CORONARY ARTERY WITHOUT ANGINA PECTORIS: ICD-10-CM

## 2017-05-05 DIAGNOSIS — Z79.82 LONG TERM (CURRENT) USE OF ASPIRIN: ICD-10-CM

## 2017-05-05 DIAGNOSIS — I10 ESSENTIAL (PRIMARY) HYPERTENSION: ICD-10-CM

## 2017-05-05 DIAGNOSIS — Z86.19 PERSONAL HISTORY OF OTHER INFECTIOUS AND PARASITIC DISEASES: ICD-10-CM

## 2017-05-05 DIAGNOSIS — C34.32 MALIGNANT NEOPLASM OF LOWER LOBE, LEFT BRONCHUS OR LUNG: ICD-10-CM

## 2017-05-10 PROBLEM — I10 ESSENTIAL (PRIMARY) HYPERTENSION: Chronic | Status: ACTIVE | Noted: 2017-04-27

## 2017-05-10 PROBLEM — I21.3 ST ELEVATION (STEMI) MYOCARDIAL INFARCTION OF UNSPECIFIED SITE: Chronic | Status: ACTIVE | Noted: 2017-04-27

## 2017-05-10 PROBLEM — E78.5 HYPERLIPIDEMIA, UNSPECIFIED: Chronic | Status: ACTIVE | Noted: 2017-04-27

## 2017-05-10 PROBLEM — E11.9 TYPE 2 DIABETES MELLITUS WITHOUT COMPLICATIONS: Chronic | Status: ACTIVE | Noted: 2017-04-27

## 2017-05-10 PROBLEM — I25.10 ATHEROSCLEROTIC HEART DISEASE OF NATIVE CORONARY ARTERY WITHOUT ANGINA PECTORIS: Chronic | Status: ACTIVE | Noted: 2017-04-27

## 2017-05-10 PROBLEM — C34.90 MALIGNANT NEOPLASM OF UNSPECIFIED PART OF UNSPECIFIED BRONCHUS OR LUNG: Chronic | Status: ACTIVE | Noted: 2017-04-27

## 2017-05-12 ENCOUNTER — APPOINTMENT (OUTPATIENT)
Dept: THORACIC SURGERY | Facility: CLINIC | Age: 67
End: 2017-05-12

## 2017-05-12 ENCOUNTER — OUTPATIENT (OUTPATIENT)
Dept: OUTPATIENT SERVICES | Facility: HOSPITAL | Age: 67
LOS: 1 days | End: 2017-05-12
Payer: MEDICAID

## 2017-05-12 VITALS
BODY MASS INDEX: 26.46 KG/M2 | WEIGHT: 118 LBS | RESPIRATION RATE: 20 BRPM | HEART RATE: 98 BPM | DIASTOLIC BLOOD PRESSURE: 65 MMHG | TEMPERATURE: 98.2 F | SYSTOLIC BLOOD PRESSURE: 136 MMHG | OXYGEN SATURATION: 99 %

## 2017-05-12 DIAGNOSIS — Z41.9 ENCOUNTER FOR PROCEDURE FOR PURPOSES OTHER THAN REMEDYING HEALTH STATE, UNSPECIFIED: Chronic | ICD-10-CM

## 2017-05-12 DIAGNOSIS — Z98.890 OTHER SPECIFIED POSTPROCEDURAL STATES: Chronic | ICD-10-CM

## 2017-05-12 PROCEDURE — 71020: CPT | Mod: 26

## 2017-05-12 PROCEDURE — 71046 X-RAY EXAM CHEST 2 VIEWS: CPT

## 2017-06-20 PROBLEM — R91.1 LUNG NODULE: Status: RESOLVED | Noted: 2017-04-21 | Resolved: 2017-06-20

## 2017-06-23 ENCOUNTER — APPOINTMENT (OUTPATIENT)
Dept: THORACIC SURGERY | Facility: CLINIC | Age: 67
End: 2017-06-23

## 2017-06-23 DIAGNOSIS — R91.1 SOLITARY PULMONARY NODULE: ICD-10-CM

## 2017-07-06 ENCOUNTER — FORM ENCOUNTER (OUTPATIENT)
Age: 67
End: 2017-07-06

## 2017-07-07 ENCOUNTER — OUTPATIENT (OUTPATIENT)
Dept: OUTPATIENT SERVICES | Facility: HOSPITAL | Age: 67
LOS: 1 days | End: 2017-07-07
Payer: MEDICAID

## 2017-07-07 ENCOUNTER — APPOINTMENT (OUTPATIENT)
Dept: THORACIC SURGERY | Facility: CLINIC | Age: 67
End: 2017-07-07

## 2017-07-07 VITALS
DIASTOLIC BLOOD PRESSURE: 68 MMHG | HEIGHT: 59 IN | WEIGHT: 118 LBS | BODY MASS INDEX: 23.79 KG/M2 | TEMPERATURE: 97.6 F | RESPIRATION RATE: 19 BRPM | SYSTOLIC BLOOD PRESSURE: 142 MMHG | HEART RATE: 96 BPM | OXYGEN SATURATION: 96 %

## 2017-07-07 DIAGNOSIS — Z41.9 ENCOUNTER FOR PROCEDURE FOR PURPOSES OTHER THAN REMEDYING HEALTH STATE, UNSPECIFIED: Chronic | ICD-10-CM

## 2017-07-07 DIAGNOSIS — Z98.890 OTHER SPECIFIED POSTPROCEDURAL STATES: Chronic | ICD-10-CM

## 2017-07-07 PROCEDURE — 71020: CPT | Mod: 26

## 2017-07-07 PROCEDURE — 71046 X-RAY EXAM CHEST 2 VIEWS: CPT

## 2017-07-07 RX ORDER — LORATADINE AND PSEUDOEPHEDRINE SULFATE 10; 240 MG/1; MG/1
10-240 TABLET, FILM COATED, EXTENDED RELEASE ORAL
Qty: 30 | Refills: 0 | Status: DISCONTINUED | COMMUNITY
Start: 2017-02-24

## 2017-07-07 RX ORDER — LIDOCAINE 5 G/100G
5 OINTMENT TOPICAL
Qty: 35 | Refills: 0 | Status: DISCONTINUED | COMMUNITY
Start: 2017-02-28

## 2017-07-07 RX ORDER — SODIUM CHLORIDE 0.65 %
0.65 AEROSOL, SPRAY (ML) NASAL
Qty: 44 | Refills: 0 | Status: DISCONTINUED | COMMUNITY
Start: 2017-02-07

## 2017-07-07 RX ORDER — NITROGLYCERIN 0.4 MG/1
0.4 TABLET SUBLINGUAL
Qty: 100 | Refills: 0 | Status: ACTIVE | COMMUNITY
Start: 2016-12-13

## 2017-07-07 RX ORDER — HYDROCORTISONE 25 MG/G
2.5 CREAM TOPICAL
Qty: 60 | Refills: 0 | Status: DISCONTINUED | COMMUNITY
Start: 2017-03-04

## 2017-07-07 RX ORDER — ACETAMINOPHEN 325 MG/1
325 TABLET ORAL
Qty: 100 | Refills: 0 | Status: DISCONTINUED | COMMUNITY
Start: 2017-05-01

## 2017-07-07 RX ORDER — TRAMADOL HYDROCHLORIDE 50 MG/1
50 TABLET, COATED ORAL
Qty: 60 | Refills: 0 | Status: DISCONTINUED | COMMUNITY
Start: 2017-05-01

## 2017-07-07 RX ORDER — VALACYCLOVIR 1 G/1
1 TABLET, FILM COATED ORAL
Qty: 18 | Refills: 0 | Status: DISCONTINUED | COMMUNITY
Start: 2017-02-22

## 2017-07-07 RX ORDER — ISOSORBIDE DINITRATE 5 MG/1
5 TABLET ORAL
Qty: 60 | Refills: 0 | Status: DISCONTINUED | COMMUNITY
Start: 2016-11-29

## 2017-07-07 RX ORDER — HYDROCORTISONE 2.5% 25 MG/G
2.5 CREAM TOPICAL
Qty: 30 | Refills: 0 | Status: DISCONTINUED | COMMUNITY
Start: 2016-10-13

## 2017-07-07 RX ORDER — NITROFURANTOIN (MONOHYDRATE/MACROCRYSTALS) 25; 75 MG/1; MG/1
100 CAPSULE ORAL
Qty: 14 | Refills: 0 | Status: DISCONTINUED | COMMUNITY
Start: 2017-03-04

## 2017-07-07 RX ORDER — GABAPENTIN 300 MG/1
300 CAPSULE ORAL
Qty: 90 | Refills: 0 | Status: DISCONTINUED | COMMUNITY
Start: 2017-02-24

## 2017-07-07 RX ORDER — AMOXICILLIN 500 MG/1
500 CAPSULE ORAL
Qty: 28 | Refills: 0 | Status: DISCONTINUED | COMMUNITY
Start: 2017-02-07

## 2017-07-07 RX ORDER — DOCUSATE SODIUM 100 MG/1
100 CAPSULE, LIQUID FILLED ORAL
Qty: 90 | Refills: 0 | Status: DISCONTINUED | COMMUNITY
Start: 2017-02-07

## 2017-10-29 PROBLEM — Z09 POSTOP CHECK: Status: RESOLVED | Noted: 2017-05-10 | Resolved: 2017-10-29

## 2017-11-02 ENCOUNTER — MESSAGE (OUTPATIENT)
Age: 67
End: 2017-11-02

## 2017-11-03 ENCOUNTER — APPOINTMENT (OUTPATIENT)
Dept: THORACIC SURGERY | Facility: CLINIC | Age: 67
End: 2017-11-03
Payer: MEDICAID

## 2017-11-03 DIAGNOSIS — Z09 ENCOUNTER FOR FOLLOW-UP EXAMINATION AFTER COMPLETED TREATMENT FOR CONDITIONS OTHER THAN MALIGNANT NEOPLASM: ICD-10-CM

## 2017-12-08 ENCOUNTER — APPOINTMENT (OUTPATIENT)
Dept: THORACIC SURGERY | Facility: CLINIC | Age: 67
End: 2017-12-08
Payer: MEDICAID

## 2017-12-15 ENCOUNTER — APPOINTMENT (OUTPATIENT)
Dept: THORACIC SURGERY | Facility: CLINIC | Age: 67
End: 2017-12-15
Payer: MEDICAID

## 2018-01-05 ENCOUNTER — APPOINTMENT (OUTPATIENT)
Dept: THORACIC SURGERY | Facility: CLINIC | Age: 68
End: 2018-01-05
Payer: MEDICAID

## 2018-01-17 ENCOUNTER — MESSAGE (OUTPATIENT)
Age: 68
End: 2018-01-17

## 2018-01-19 ENCOUNTER — APPOINTMENT (OUTPATIENT)
Dept: THORACIC SURGERY | Facility: CLINIC | Age: 68
End: 2018-01-19
Payer: MEDICAID

## 2018-02-02 ENCOUNTER — APPOINTMENT (OUTPATIENT)
Dept: THORACIC SURGERY | Facility: CLINIC | Age: 68
End: 2018-02-02
Payer: MEDICAID

## 2018-02-02 VITALS
HEART RATE: 99 BPM | SYSTOLIC BLOOD PRESSURE: 205 MMHG | DIASTOLIC BLOOD PRESSURE: 98 MMHG | HEIGHT: 58 IN | TEMPERATURE: 97.9 F | BODY MASS INDEX: 26.03 KG/M2 | RESPIRATION RATE: 19 BRPM | OXYGEN SATURATION: 96 % | WEIGHT: 124 LBS

## 2018-02-02 PROCEDURE — 99214 OFFICE O/P EST MOD 30 MIN: CPT

## 2018-04-20 ENCOUNTER — APPOINTMENT (OUTPATIENT)
Dept: THORACIC SURGERY | Facility: CLINIC | Age: 68
End: 2018-04-20
Payer: MEDICAID

## 2018-04-20 VITALS
TEMPERATURE: 97.8 F | SYSTOLIC BLOOD PRESSURE: 184 MMHG | OXYGEN SATURATION: 98 % | HEART RATE: 79 BPM | RESPIRATION RATE: 18 BRPM | DIASTOLIC BLOOD PRESSURE: 88 MMHG

## 2018-04-20 DIAGNOSIS — R13.10 DYSPHAGIA, UNSPECIFIED: ICD-10-CM

## 2018-04-20 DIAGNOSIS — K64.9 UNSPECIFIED HEMORRHOIDS: ICD-10-CM

## 2018-04-20 PROCEDURE — 99214 OFFICE O/P EST MOD 30 MIN: CPT

## 2018-04-20 RX ORDER — MECLIZINE HYDROCHLORIDE 12.5 MG/1
12.5 TABLET ORAL
Qty: 50 | Refills: 0 | Status: COMPLETED | COMMUNITY
Start: 2017-09-26

## 2018-04-20 RX ORDER — ISOPROPYL ALCOHOL 70 G/100G
70 LIQUID TOPICAL
Qty: 100 | Refills: 0 | Status: COMPLETED | COMMUNITY
Start: 2017-05-16

## 2018-04-20 RX ORDER — LABETALOL HYDROCHLORIDE 200 MG/1
200 TABLET, FILM COATED ORAL
Qty: 60 | Refills: 0 | Status: COMPLETED | COMMUNITY
Start: 2017-10-24

## 2018-04-20 RX ORDER — LOSARTAN POTASSIUM 25 MG/1
25 TABLET, FILM COATED ORAL
Qty: 30 | Refills: 0 | Status: COMPLETED | COMMUNITY
Start: 2017-10-19

## 2018-04-20 RX ORDER — GUAIFENESIN AND DEXTROMETHORPHAN HYDROBROMIDE 100; 10 MG/5ML; MG/5ML
100-10 SOLUTION ORAL
Qty: 120 | Refills: 0 | Status: COMPLETED | COMMUNITY
Start: 2018-02-05

## 2018-04-20 RX ORDER — OXYBUTYNIN CHLORIDE 10 MG/1
10 TABLET, EXTENDED RELEASE ORAL
Qty: 30 | Refills: 0 | Status: COMPLETED | COMMUNITY
Start: 2017-10-24

## 2018-04-20 RX ORDER — IPRATROPIUM BROMIDE AND ALBUTEROL 20; 100 UG/1; UG/1
20-100 SPRAY, METERED RESPIRATORY (INHALATION)
Qty: 4 | Refills: 0 | Status: COMPLETED | COMMUNITY
Start: 2018-02-12

## 2018-04-20 RX ORDER — ALBUTEROL SULFATE 90 UG/1
108 (90 BASE) AEROSOL, METERED RESPIRATORY (INHALATION)
Qty: 18 | Refills: 0 | Status: COMPLETED | COMMUNITY
Start: 2017-10-24

## 2018-04-20 RX ORDER — FUROSEMIDE 20 MG/1
20 TABLET ORAL
Qty: 30 | Refills: 0 | Status: COMPLETED | COMMUNITY
Start: 2017-09-26

## 2018-05-01 ENCOUNTER — APPOINTMENT (OUTPATIENT)
Dept: OTOLARYNGOLOGY | Facility: CLINIC | Age: 68
End: 2018-05-01
Payer: MEDICAID

## 2018-05-01 PROCEDURE — G8998: CPT | Mod: GN,NC,CI

## 2018-05-01 PROCEDURE — G8996: CPT | Mod: GN,NC,CI

## 2018-05-01 PROCEDURE — G8997: CPT | Mod: GN,NC,CI

## 2018-05-01 PROCEDURE — 92612 ENDOSCOPY SWALLOW (FEES) VID: CPT | Mod: GN

## 2018-11-02 ENCOUNTER — APPOINTMENT (OUTPATIENT)
Dept: THORACIC SURGERY | Facility: CLINIC | Age: 68
End: 2018-11-02
Payer: MEDICAID

## 2018-11-02 VITALS
TEMPERATURE: 97.7 F | HEART RATE: 84 BPM | SYSTOLIC BLOOD PRESSURE: 162 MMHG | RESPIRATION RATE: 19 BRPM | OXYGEN SATURATION: 97 % | BODY MASS INDEX: 25.8 KG/M2 | HEIGHT: 59 IN | DIASTOLIC BLOOD PRESSURE: 74 MMHG | WEIGHT: 128 LBS

## 2018-11-02 DIAGNOSIS — Z85.118 PERSONAL HISTORY OF OTHER MALIGNANT NEOPLASM OF BRONCHUS AND LUNG: ICD-10-CM

## 2018-11-02 DIAGNOSIS — K62.5 HEMORRHAGE OF ANUS AND RECTUM: ICD-10-CM

## 2018-11-02 PROCEDURE — 99213 OFFICE O/P EST LOW 20 MIN: CPT

## 2018-11-02 RX ORDER — OMEPRAZOLE 20 MG/1
20 TABLET, DELAYED RELEASE ORAL
Qty: 60 | Refills: 0 | Status: DISCONTINUED | COMMUNITY
Start: 2017-02-07 | End: 2018-11-02

## 2018-11-02 RX ORDER — HYDROCORTISONE 25 MG/G
2.5 CREAM TOPICAL DAILY
Qty: 1 | Refills: 0 | Status: DISCONTINUED | COMMUNITY
Start: 2018-04-20 | End: 2018-11-02

## 2019-05-10 ENCOUNTER — APPOINTMENT (OUTPATIENT)
Dept: THORACIC SURGERY | Facility: CLINIC | Age: 69
End: 2019-05-10
Payer: MEDICAID

## 2019-05-10 VITALS
DIASTOLIC BLOOD PRESSURE: 91 MMHG | BODY MASS INDEX: 28.43 KG/M2 | TEMPERATURE: 97.2 F | OXYGEN SATURATION: 97 % | HEART RATE: 86 BPM | WEIGHT: 141 LBS | HEIGHT: 59 IN | RESPIRATION RATE: 15 BRPM | SYSTOLIC BLOOD PRESSURE: 183 MMHG

## 2019-05-10 PROCEDURE — 99213 OFFICE O/P EST LOW 20 MIN: CPT

## 2019-05-10 NOTE — PHYSICAL EXAM
[Neck Appearance] : the appearance of the neck was normal [] : no respiratory distress [Neck Cervical Mass (___cm)] : no neck mass was observed [Apical Impulse] : the apical impulse was normal [Heart Sounds] : normal S1 and S2 [2+] : left 2+ [Abdomen Soft] : soft [Abdomen Tenderness] : non-tender [No Focal Deficits] : no focal deficits [Oriented To Time, Place, And Person] : oriented to person, place, and time

## 2019-05-12 NOTE — CONSULT LETTER
[FreeTextEntry2] : Dr. Malick Lacey (pulm) [FreeTextEntry3] : Pablito Monroe MD\par Professor, Cardiovascular & Thoracic Surgery\par [facility]\par Director of the [facility] \par Director of Thoracic Surgery, [facility]\par [facility]\par [address]\par [facility] 4th Floor\par [place] [address]\par Phone: [phone]\par Fax: [phone]

## 2019-05-12 NOTE — ASSESSMENT
[FreeTextEntry1] : 67 y/o female, never smoker, with hx of HTN, DM, CAD s/p stent placement x 2, HLDH, is s/p left VATS, robotic assisted, left lower lobe wedge resection, completion of left lower lobectomy, MLND on 4/28/17. 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 Primary lymphoepithelioma-like carcinoma of the lung is rare (accounting for <1% of lung cancer cases), and this tumor can be accepted as a lung primary only if metastatic nasopharyngeal carcinoma is clinically excluded.\par \par Patient saw oncologist Dr. Roscoe Wallace and completed 2 times of chemo, unable to tolerate. She was also advised to see ENT Dr. Kyle Holman to R/O head and neck malignancy as given her path result from the notes, she was negative for nasopharyngeal cancer on nasal endoscopy.\par \par CT chest on 10/14/18: subcentimeter pulmonary nodules in the right lung, F/U CT chest is 6 months is recommended. CAD. \par \par CT Chest 4/27/19\par - s/p LLL, stable indeterminate bilaterally pulmonary nodules and BROOK[place]N ggo. \par - borderline cardiomegaly. CAD\par - small sliding hiatal hernia\par \par Today, patient presents for 6 month follow-up visit with chest CT. Patient doing well. Denies SOB, cough, hemoptysis, chest discomfort, fever/chills. Per daughter, had recent workup for HTN with cardiologist.\par \par CT stable, no clinical or radiographic evidence of recurrent disease. \par \par I have reviewed the patient's medical records and diagnostic images at the time of this office consultation and have made the following recommendation.\par Plan:\par 1. RTC 6m with CT chest\par 2. Follow-up with PCP and cardio in regards to blood pressure control

## 2019-05-12 NOTE — HISTORY OF PRESENT ILLNESS
[FreeTextEntry1] : 67 y/o female, never smoker, with hx of HTN, DM, CAD s/p stent placement x 2, HLDH, is s/p left VATS, robotic assisted, left lower lobe wedge resection, completion of left lower lobectomy, MLND on 4/28/17. 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 Primary lymphoepithelioma-like carcinoma of the lung is rare (accounting for <1% of lung cancer cases), and this tumor can be accepted as a lung primary only if metastatic nasopharyngeal carcinoma is clinically excluded.\par \par Patient saw oncologist Dr. Roscoe Wallace and completed 2 times of chemo, unable to tolerate. She was also advised to see ENT Dr. Kyle Holman to R/O head and neck malignancy as given her path result from the notes, she was negative for nasopharyngeal cancer on nasal endoscopy.\par \par CT chest on 10/14/18: subcentimeter pulmonary nodules in the right lung, F/U CT chest is 6 months is recommended. CAD. \par \par CT Chest 4/27/19\par - s/p LLL, stable indeterminate bilaterally pulmonary nodules and [place] ggo. \par - borderline cardiomegaly. CAD\par - small sliding hiatal hernia\par \par Today, patient presents for 6 month follow-up visit with chest CT. Patient doing well. Denies SOB, cough, hemoptysis, chest discomfort, fever/chills. Per daughter, had recent workup for HTN with cardiologist.

## 2019-11-15 ENCOUNTER — APPOINTMENT (OUTPATIENT)
Dept: THORACIC SURGERY | Facility: CLINIC | Age: 69
End: 2019-11-15
Payer: MEDICAID

## 2019-11-15 VITALS
TEMPERATURE: 97.7 F | WEIGHT: 136 LBS | RESPIRATION RATE: 18 BRPM | HEIGHT: 59 IN | SYSTOLIC BLOOD PRESSURE: 147 MMHG | DIASTOLIC BLOOD PRESSURE: 65 MMHG | OXYGEN SATURATION: 100 % | HEART RATE: 83 BPM | BODY MASS INDEX: 27.42 KG/M2

## 2019-11-15 PROCEDURE — 99213 OFFICE O/P EST LOW 20 MIN: CPT

## 2019-11-18 NOTE — PHYSICAL EXAM
[Neck Appearance] : the appearance of the neck was normal [Neck Cervical Mass (___cm)] : no neck mass was observed [Jugular Venous Distention Increased] : there was no jugular-venous distention [] : no respiratory distress [Respiration, Rhythm And Depth] : normal respiratory rhythm and effort [Exaggerated Use Of Accessory Muscles For Inspiration] : no accessory muscle use [Auscultation Breath Sounds / Voice Sounds] : lungs were clear to auscultation bilaterally [Apical Impulse] : the apical impulse was normal [Examination Of The Chest] : the chest was normal in appearance [No Focal Deficits] : no focal deficits [Oriented To Time, Place, And Person] : oriented to person, place, and time [Impaired Insight] : insight and judgment were intact

## 2019-11-21 NOTE — CONSULT LETTER
[Consult Letter:] : I had the pleasure of evaluating your patient, [unfilled]. [Dear  ___] : Dear  [unfilled], [Please see my note below.] : Please see my note below. [Consult Closing:] : Thank you very much for allowing me to participate in the care of this patient.  If you have any questions, please do not hesitate to contact me. [Sincerely,] : Sincerely, [DrBassam  ___] : Dr. ADAM [DrBassam ___] : Dr. ADAM [FreeTextEntry3] : Pablito Monroe MD\par Professor, Cardiovascular & Thoracic Surgery\par [facility]\par Director of the [facility] \par Director of Thoracic Surgery, [facility]\par [facility]\par [address]\par [facility] 4th Floor\par [place] [address]\par Phone: [phone]\par Fax: [phone]

## 2019-11-21 NOTE — HISTORY OF PRESENT ILLNESS
[FreeTextEntry1] : 69 y/o female, never smoker, with hx of HTN, DM, CAD s/p stent placement x 2, HLD referred in 2017 for lung nodules. \par \par She is s/p left VATS, robotic assisted, left lower lobe wedge resection, completion of left lower lobectomy, MLND on 4/28/17. 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 Primary lymphoepithelioma-like carcinoma of the lung is rare (accounting for <1% of lung cancer cases), and this tumor can be accepted as a lung primary only if metastatic nasopharyngeal carcinoma is clinically excluded.\par \par Patient saw oncologist Dr. Roscoe Wallace and completed 2 rounds of chemo, unable to tolerate. She was also advised to see ENT Dr. Kyle Holman to R/O head and neck malignancy as given her path result from the notes. Pathology was negative for nasopharyngeal cancer on nasal endoscopy.\par \par CT chest on 10/14/18: subcentimeter pulmonary nodules in the right lung, F/U CT chest is 6 months is recommended. CAD. \par \par CT Chest 4/27/19\par - s/p LLL, stable indeterminate bilaterally pulmonary nodules and BROOK[place] ggo. \par - borderline cardiomegaly. CAD\par - small sliding hiatal hernia\par \par CT Chest 11/9/19\par - Stable postsurgical changes compatible LLLx\par - Subcentimeter lung nodules, unchanged since 2018\par - Mild cardiomegaly\par - Stable mild bronchiectasis and subsegmental atelectasis and/or parenchymal scarring in the RML\par \par Patient presents today for a follow-up visit. Patient doing well. Denies cough, hemoptysis, chest discomfort, fever/chills.

## 2019-11-21 NOTE — ASSESSMENT
[FreeTextEntry1] : 69 y/o female, never smoker, with hx of HTN, DM, CAD s/p stent placement x 2, HLD referred in 2017 for lung nodules. \par \par She is s/p left VATS, robotic assisted, left lower lobe wedge resection, completion of left lower lobectomy, MLND on 4/28/17. 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 Primary lymphoepithelioma-like carcinoma of the lung is rare (accounting for <1% of lung cancer cases), and this tumor can be accepted as a lung primary only if metastatic nasopharyngeal carcinoma is clinically excluded.\par \par Patient saw oncologist Dr. Roscoe Wallace and completed 2 rounds of chemo, unable to tolerate. She was also advised to see ENT Dr. Kyle Holman to R/O head and neck malignancy as given her path result from the notes. Pathology was negative for nasopharyngeal cancer on nasal endoscopy.\par \par CT chest on 10/14/18: subcentimeter pulmonary nodules in the right lung, F/U CT chest is 6 months is recommended. CAD. \par \par CT Chest 4/27/19\par - s/p LLL, stable indeterminate bilaterally pulmonary nodules and [place] ggo. \par - borderline cardiomegaly. CAD\par - small sliding hiatal hernia\par \par CT Chest 11/9/19\par - Stable postsurgical changes compatible LLLx\par - Subcentimeter lung nodules, unchanged since 2018\par - Mild cardiomegaly\par - Stable mild bronchiectasis and subsegmental atelectasis and/or parenchymal scarring in the RML\par \par Patient presents today for a follow-up visit. Patient doing well. Denies cough, hemoptysis, chest discomfort, fever/chills. \par \par CT Chest 11/9/19 reviewed with patient and family. CT stable, no clinical or radiographic evidence of recurrent disease. \par \par I have reviewed the patient's medical records and diagnostic images at the time of this office consultation and have made the following recommendation.\par Plan:\par 1. RTC 6m with CT Chest

## 2020-06-26 ENCOUNTER — APPOINTMENT (OUTPATIENT)
Dept: THORACIC SURGERY | Facility: CLINIC | Age: 70
End: 2020-06-26
Payer: MEDICAID

## 2020-06-26 PROCEDURE — 99212 OFFICE O/P EST SF 10 MIN: CPT | Mod: 95

## 2020-06-30 NOTE — HISTORY OF PRESENT ILLNESS
[Medical Office: ([facility])___] : at the medical office located in  [Home] : at home, [unfilled] , at the time of the visit. [Verbal consent obtained from patient] : the patient, [unfilled] [FreeTextEntry1] : \par \par 70 y/o female, never smoker, with hx of HTN, DM, CAD s/p stent placement x 2, HLD referred in 2017 for lung nodules. She is s/p left VATS, robotic assisted, left lower lobe wedge resection, completion of left lower lobectomy, MLND on 4/28/17. 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 Primary lymphoepithelioma-like carcinoma of the lung is rare (accounting for <1% of lung cancer cases), and this tumor can be accepted as a lung primary only if metastatic nasopharyngeal carcinoma is clinically excluded.\par \par Patient saw oncologist Dr. Roscoe Wallace and completed 2 rounds of chemo, unable to tolerate. She was also advised to see ENT Dr. Kyle Holman to R/O head and neck malignancy as given her path result from the notes. Pathology was negative for nasopharyngeal cancer on nasal endoscopy.\par \par CT chest on 10/14/18: subcentimeter pulmonary nodules in the right lung, F/U CT chest is 6 months is recommended. CAD. \par \par CT Chest 4/27/19\par - s/p LLL, stable indeterminate bilaterally pulmonary nodules and BR[place] ggo. \par - borderline cardiomegaly. CAD\par - small sliding hiatal hernia\par \par CT Chest 11/9/19\par - Stable postsurgical changes compatible LLLx\par - Subcentimeter lung nodules, unchanged since 2018\par - Mild cardiomegaly\par - Stable mild bronchiectasis and subsegmental atelectasis and/or parenchymal scarring in the RML\par \par CT Chest 6/22/20\par - Stable postsurgical changes related to LLLx\par - Stable (6 or mm less) pulmonary nodules\par - No significant interval change since the previous exam

## 2020-06-30 NOTE — CONSULT LETTER
[FreeTextEntry3] : Pablito Monroe MD\par Professor, Cardiovascular & Thoracic Surgery\par [facility]\par Director of the [facility] \par Director of Thoracic Surgery, [facility]\par [facility]\par [address]\par [facility] 4th Floor\par [place] [address]\par Phone: [phone]\par Fax: [phone]

## 2020-06-30 NOTE — END OF VISIT
[Time Spent: ___ minutes] : I have spent [unfilled] minutes of time on the encounter. [FreeTextEntry3] : I, DANE CAMPOVERDE , am scribing for and in the presence of LONG SALINAS the following sections: history of present illness, past medical/family/surgical/family/social history, review of systems, vital signs, physical exam, and disposition.\par

## 2020-06-30 NOTE — ASSESSMENT
[FreeTextEntry1] : \par 68 y/o female, never smoker, with hx of HTN, DM, CAD s/p stent placement x 2, HLD referred in 2017 for lung nodules. She is s/p left VATS, robotic assisted, left lower lobe wedge resection, completion of left lower lobectomy, MLND on 4/28/17. 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 Primary lymphoepithelioma-like carcinoma of the lung is rare (accounting for <1% of lung cancer cases), and this tumor can be accepted as a lung primary only if metastatic nasopharyngeal carcinoma is clinically excluded.\par \par Patient saw oncologist Dr. Roscoe Wallace and completed 2 rounds of chemo, unable to tolerate. She was also advised to see ENT Dr. Kyle Holman to R/O head and neck malignancy as given her path result from the notes. Pathology was negative for nasopharyngeal cancer on nasal endoscopy.\par \par CT chest on 10/14/18: subcentimeter pulmonary nodules in the right lung, F/U CT chest is 6 months is recommended. CAD. \par \par CT Chest 4/27/19\par - s/p LLL, stable indeterminate bilaterally pulmonary nodules and [place] ggo. \par - borderline cardiomegaly. CAD\par - small sliding hiatal hernia\par \par CT Chest 11/9/19\par - Stable postsurgical changes compatible LLLx\par - Subcentimeter lung nodules, unchanged since 2018\par - Mild cardiomegaly\par - Stable mild bronchiectasis and subsegmental atelectasis and/or parenchymal scarring in the RML\par \par CT Chest 6/22/20\par - Stable postsurgical changes related to LLLx\par - Stable (6 or mm less) pulmonary nodules\par - No significant interval change since the previous exam\par \par CT Chest 6/22/20 reviewed with patient and family member. CT stable, no clinical or radiographic evidence of recurrent disease. \par \par I have reviewed the patient's medical records and diagnostic images at the time of this office consultation and have made the following recommendation.\par Plan:\par 1. CT Chest 1 year

## 2021-04-23 ENCOUNTER — APPOINTMENT (OUTPATIENT)
Dept: THORACIC SURGERY | Facility: CLINIC | Age: 71
End: 2021-04-23
Payer: MEDICAID

## 2021-04-23 PROCEDURE — 99442: CPT

## 2021-04-23 NOTE — REASON FOR VISIT
[Home] : at home, [unfilled] , at the time of the visit. [Medical Office: ([facility])___] : at the medical office located in  [Verbal consent obtained from patient] : the patient, [unfilled]

## 2021-04-26 NOTE — END OF VISIT
[Time Spent: ___ minutes] : I have spent [unfilled] minutes of time on the encounter. [FreeTextEntry3] : I, DANE CAMPOVERDE , am scribing for and in the presence of LONG SALINAS the following sections: history of present illness, past medical/family/surgical/family/social history, review of systems, vital signs, physical exam, and disposition.\par  \par I personally performed the services described in the documentation, reviewed the documentation recorded by the scribe in my presence and it accurately and completely records my words and actions.

## 2021-04-26 NOTE — CONSULT LETTER
[Dear  ___] : Dear  [unfilled], [Consult Letter:] : I had the pleasure of evaluating your patient, [unfilled]. [Please see my note below.] : Please see my note below. [Sincerely,] : Sincerely, [FreeTextEntry2] : Dr. Malick Lacey (ref)\par Dr. Steve Arana \par Dr. Elfego Askew \par Dr. Aníbal Schmitz  [FreeTextEntry3] : Pablito Monroe MD\par Professor, Cardiovascular & Thoracic Surgery\par [facility]\par Director of the [facility] \par Director of Thoracic Surgery, [facility]\par [facility]\par [address]\par [facility] 4th Floor\par [place] [address]\par Phone: [phone]\par Fax: [phone]

## 2021-04-26 NOTE — ASSESSMENT
[FreeTextEntry1] : 71 y/o female, never smoker, with hx of HTN, DM, CAD s/p stent placement x 2, HLD referred in 2017 for lung nodules. She is s/p left VATS, robotic assisted, left lower lobe wedge resection, completion of left lower lobectomy, MLND on 4/28/17. 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 Primary lymphoepithelioma-like carcinoma of the lung is rare (accounting for <1% of lung cancer cases), and this tumor can be accepted as a lung primary only if metastatic nasopharyngeal carcinoma is clinically excluded.\par \par Patient saw oncologist Dr. Roscoe Wallace and completed 2 rounds of chemo, unable to tolerate. She was also advised to see ENT Dr. Kyle Holman to R/O head and neck malignancy as given her path result from the notes. Pathology was negative for nasopharyngeal cancer on nasal endoscopy.\par \par CT chest on 10/14/18: subcentimeter pulmonary nodules in the right lung, F/U CT chest is 6 months is recommended. CAD. \par \par CT Chest 4/27/19\par - s/p LLL, stable indeterminate bilaterally pulmonary nodules and [place] ggo. \par - borderline cardiomegaly. CAD\par - small sliding hiatal hernia\par \par CT Chest 11/9/19\par - Stable postsurgical changes compatible LLLx\par - Subcentimeter lung nodules, unchanged since 2018\par - Mild cardiomegaly\par - Stable mild bronchiectasis and subsegmental atelectasis and/or parenchymal scarring in the RML\par \par CT Chest 6/22/20\par - Stable postsurgical changes related to LLLx\par - Stable (6 or mm less) pulmonary nodules\par - No significant interval change since the previous exam \par \par CT Chest 4/15/21\par - Stable LULx surgical scarring changes, a few small lung nodules (up to 4mm) and minor lung scarring\par - No evidence of locally recurrent or metastatic intrathoracic lung carcinoma\par - Heart size remains normal with extensive multivessel coronary calcifications and stenting. \par \par Patient doing well. Denies SOB, cough, hemoptysis, chest discomfort, fever/chills. \par \par CT Chest 4/15/21 reviewed with patient. CT stable, no clinical or radiographic evidence of recurrent disease. \par \par I have reviewed the patient's medical records and diagnostic images at the time of this office consultation and have made the following recommendation.\par Plan:\par 1. Follow up with cardiologist\par 2. CT Chest in 1 year

## 2021-04-26 NOTE — HISTORY OF PRESENT ILLNESS
[FreeTextEntry1] : 69 y/o female, never smoker, with hx of HTN, DM, CAD s/p stent placement x 2, HLD referred in 2017 for lung nodules. She is s/p left VATS, robotic assisted, left lower lobe wedge resection, completion of left lower lobectomy, MLND on 4/28/17. 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 Primary lymphoepithelioma-like carcinoma of the lung is rare (accounting for <1% of lung cancer cases), and this tumor can be accepted as a lung primary only if metastatic nasopharyngeal carcinoma is clinically excluded.\par \par Patient saw oncologist Dr. Roscoe Wallace and completed 2 rounds of chemo, unable to tolerate. She was also advised to see ENT Dr. Kyle Holman to R/O head and neck malignancy as given her path result from the notes. Pathology was negative for nasopharyngeal cancer on nasal endoscopy.\par \par CT chest on 10/14/18: subcentimeter pulmonary nodules in the right lung, F/U CT chest is 6 months is recommended. CAD. \par \par CT Chest 4/27/19\par - s/p LLL, stable indeterminate bilaterally pulmonary nodules and BR[place] ggo. \par - borderline cardiomegaly. CAD\par - small sliding hiatal hernia\par \par CT Chest 11/9/19\par - Stable postsurgical changes compatible LLLx\par - Subcentimeter lung nodules, unchanged since 2018\par - Mild cardiomegaly\par - Stable mild bronchiectasis and subsegmental atelectasis and/or parenchymal scarring in the RML\par \par CT Chest 6/22/20\par - Stable postsurgical changes related to LLLx\par - Stable (6 or mm less) pulmonary nodules\par - No significant interval change since the previous exam \par \par CT Chest 4/15/21\par - Stable LULx surgical scarring changes, a few small lung nodules (up to 4mm) and minor lung scarring\par - No evidence of locally recurrent or metastatic intrathoracic lung carcinoma\par - Heart size remains normal with extensive multivessel coronary calcifications and stenting.

## 2021-11-10 ENCOUNTER — NON-APPOINTMENT (OUTPATIENT)
Age: 71
End: 2021-11-10

## 2022-11-14 ENCOUNTER — APPOINTMENT (OUTPATIENT)
Dept: THORACIC SURGERY | Facility: CLINIC | Age: 72
End: 2022-11-14

## 2022-11-14 PROCEDURE — 99441: CPT

## 2022-11-14 NOTE — ASSESSMENT
[FreeTextEntry1] : 72 y/o female, never smoker, with hx of HTN, DM, CAD s/p stent placement x 2, HLD referred in 2017 for lung nodules.\par \par On 04/28/2017, she underwent left VATS, robotic assisted, left lower lobe wedge resection, completion of left lower lobectomy, MLND. 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 Roscoe Wallace and completed 2 rounds of chemo, unable to tolerate. She was also advised to see ENT Dr. Kyle Holman to R/O head and neck malignancy as given her path result from the notes. Pathology was negative for nasopharyngeal cancer on nasal endoscopy.\par \par Pt presents today with for a follow up. CT chest on 11/08/2022 was reviewed, with evidence of stable small lung nodule, and mild bronchiectatic changes in the RML a/w linear atelectasis or scarring. Patient is doing well in general. No unintentional weight loss, headache, anorexia, fatigue, persist cough, chest pain, abd pain, dyspnea, hemoptysis. Will continue follow up in one year with a CT chest. \par \par Plan: follow up in one year with a noncontrast CT chest.

## 2022-11-14 NOTE — DATA REVIEWED
[FreeTextEntry1] : CT chest on 10/14/18: subcentimeter pulmonary nodules in the right lung, F/U CT chest is 6 months is recommended. CAD. \par \par CT Chest 4/27/19\par - s/p LLL, stable indeterminate bilaterally pulmonary nodules and [place] ggo. \par - borderline cardiomegaly. CAD\par - small sliding hiatal hernia\par \par CT Chest 11/9/19\par - Stable postsurgical changes compatible LLLx\par - Subcentimeter lung nodules, unchanged since 2018\par - Mild cardiomegaly\par - Stable mild bronchiectasis and subsegmental atelectasis and/or parenchymal scarring in the RML\par \par CT Chest 6/22/20\par - Stable postsurgical changes related to LLLx\par - Stable (6 or mm less) pulmonary nodules\par - No significant interval change since the previous exam \par \par CT Chest 4/15/21\par - Stable LULx surgical scarring changes, a few small lung nodules (up to 4mm) and minor lung scarring\par - No evidence of locally recurrent or metastatic intrathoracic lung carcinoma\par - Heart size remains normal with extensive multivessel coronary calcifications and stenting. \par

## 2022-11-14 NOTE — REASON FOR VISIT
[Home] : at home, [unfilled] , at the time of the visit. [Medical Office: ([facility])___] : at the medical office located in  [Family Member] : family member [Verbal consent obtained from patient] : the patient, [unfilled] [Follow-Up: _____] : a [unfilled] follow-up visit

## 2022-11-14 NOTE — HISTORY OF PRESENT ILLNESS
[FreeTextEntry1] : 72 y/o female, never smoker, with hx of HTN, DM, CAD s/p stent placement x 2, HLD referred in 2017 for lung nodules.\par \par On 04/28/2017, she underwent left VATS, robotic assisted, left lower lobe wedge resection, completion of left lower lobectomy, MLND. 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 Roscoe Wallace and completed 2 rounds of chemo, unable to tolerate. She was also advised to see ENT Dr. Kyle Holman to R/O head and neck malignancy as given her path result from the notes. Pathology was negative for nasopharyngeal cancer on nasal endoscopy.\par \par Pt presents today with for a follow up, reports feeling well in general. No unintentional weight loss, headache, anorexia, fatigue, persist cough, chest pain, abd pain, dyspnea, hemoptysis.\par \par CT chest on 11/08/2022\par - Several small lung nodules are grossly stable\par - Mild bronchiectatic changes in the RML a/w linear atelectasis or scarring stable. \par

## 2023-11-03 ENCOUNTER — APPOINTMENT (OUTPATIENT)
Dept: THORACIC SURGERY | Facility: CLINIC | Age: 73
End: 2023-11-03

## 2023-11-15 ENCOUNTER — APPOINTMENT (OUTPATIENT)
Dept: THORACIC SURGERY | Facility: CLINIC | Age: 73
End: 2023-11-15
Payer: MEDICARE

## 2023-11-15 ENCOUNTER — NON-APPOINTMENT (OUTPATIENT)
Age: 73
End: 2023-11-15

## 2023-11-15 DIAGNOSIS — Z08 ENCOUNTER FOR FOLLOW-UP EXAMINATION AFTER COMPLETED TREATMENT FOR MALIGNANT NEOPLASM: ICD-10-CM

## 2023-11-15 DIAGNOSIS — Z85.118 ENCOUNTER FOR FOLLOW-UP EXAMINATION AFTER COMPLETED TREATMENT FOR MALIGNANT NEOPLASM: ICD-10-CM

## 2023-11-15 DIAGNOSIS — R91.1 SOLITARY PULMONARY NODULE: ICD-10-CM

## 2023-11-15 PROCEDURE — 99442: CPT

## 2023-11-17 PROBLEM — Z08 ENCOUNTER FOR FOLLOW-UP SURVEILLANCE OF LUNG CANCER: Status: ACTIVE | Noted: 2017-06-20

## 2023-11-17 PROBLEM — R91.1 PULMONARY NODULE: Status: ACTIVE | Noted: 2023-11-17

## 2025-06-18 ENCOUNTER — APPOINTMENT (OUTPATIENT)
Dept: THORACIC SURGERY | Facility: CLINIC | Age: 75
End: 2025-06-18
Payer: MEDICARE

## 2025-06-18 ENCOUNTER — NON-APPOINTMENT (OUTPATIENT)
Age: 75
End: 2025-06-18

## 2025-06-18 PROCEDURE — 99213 OFFICE O/P EST LOW 20 MIN: CPT | Mod: 93
